# Patient Record
Sex: MALE | Race: WHITE | NOT HISPANIC OR LATINO | Employment: FULL TIME | URBAN - METROPOLITAN AREA
[De-identification: names, ages, dates, MRNs, and addresses within clinical notes are randomized per-mention and may not be internally consistent; named-entity substitution may affect disease eponyms.]

---

## 2017-01-08 ENCOUNTER — PATIENT MESSAGE (OUTPATIENT)
Dept: INTERNAL MEDICINE | Facility: CLINIC | Age: 22
End: 2017-01-08

## 2017-01-08 DIAGNOSIS — F98.8 ADD (ATTENTION DEFICIT DISORDER): ICD-10-CM

## 2017-01-10 RX ORDER — DEXTROAMPHETAMINE SACCHARATE, AMPHETAMINE ASPARTATE MONOHYDRATE, DEXTROAMPHETAMINE SULFATE AND AMPHETAMINE SULFATE 5; 5; 5; 5 MG/1; MG/1; MG/1; MG/1
40 CAPSULE, EXTENDED RELEASE ORAL DAILY
Qty: 60 CAPSULE | Refills: 0 | Status: SHIPPED | OUTPATIENT
Start: 2017-01-10 | End: 2017-02-08 | Stop reason: SDUPTHER

## 2017-01-26 ENCOUNTER — PATIENT MESSAGE (OUTPATIENT)
Dept: INTERNAL MEDICINE | Facility: CLINIC | Age: 22
End: 2017-01-26

## 2017-02-08 ENCOUNTER — PATIENT MESSAGE (OUTPATIENT)
Dept: INTERNAL MEDICINE | Facility: CLINIC | Age: 22
End: 2017-02-08

## 2017-02-08 DIAGNOSIS — F98.8 ADD (ATTENTION DEFICIT DISORDER): ICD-10-CM

## 2017-02-08 RX ORDER — DEXTROAMPHETAMINE SACCHARATE, AMPHETAMINE ASPARTATE MONOHYDRATE, DEXTROAMPHETAMINE SULFATE AND AMPHETAMINE SULFATE 5; 5; 5; 5 MG/1; MG/1; MG/1; MG/1
40 CAPSULE, EXTENDED RELEASE ORAL DAILY
Qty: 60 CAPSULE | Refills: 0 | Status: SHIPPED | OUTPATIENT
Start: 2017-02-09 | End: 2017-03-10 | Stop reason: SDUPTHER

## 2017-02-08 NOTE — TELEPHONE ENCOUNTER
[facility] database reviewed; last refill appropriate, no other controlled-substance medications listed. Refill sent in for 2/9/17

## 2017-02-17 ENCOUNTER — PATIENT MESSAGE (OUTPATIENT)
Dept: INTERNAL MEDICINE | Facility: CLINIC | Age: 22
End: 2017-02-17

## 2017-03-09 ENCOUNTER — PATIENT MESSAGE (OUTPATIENT)
Dept: INTERNAL MEDICINE | Facility: CLINIC | Age: 22
End: 2017-03-09

## 2017-03-09 DIAGNOSIS — F98.8 ADD (ATTENTION DEFICIT DISORDER): ICD-10-CM

## 2017-03-10 RX ORDER — DEXTROAMPHETAMINE SACCHARATE, AMPHETAMINE ASPARTATE MONOHYDRATE, DEXTROAMPHETAMINE SULFATE AND AMPHETAMINE SULFATE 5; 5; 5; 5 MG/1; MG/1; MG/1; MG/1
40 CAPSULE, EXTENDED RELEASE ORAL DAILY
Qty: 60 CAPSULE | Refills: 0 | Status: SHIPPED | OUTPATIENT
Start: 2017-03-10 | End: 2017-05-02 | Stop reason: SDUPTHER

## 2017-04-16 ENCOUNTER — PATIENT MESSAGE (OUTPATIENT)
Dept: INTERNAL MEDICINE | Facility: CLINIC | Age: 22
End: 2017-04-16

## 2017-04-16 DIAGNOSIS — F98.8 ADD (ATTENTION DEFICIT DISORDER): ICD-10-CM

## 2017-04-18 ENCOUNTER — TELEPHONE (OUTPATIENT)
Dept: INTERNAL MEDICINE | Facility: CLINIC | Age: 22
End: 2017-04-18

## 2017-04-18 RX ORDER — DEXTROAMPHETAMINE SACCHARATE, AMPHETAMINE ASPARTATE MONOHYDRATE, DEXTROAMPHETAMINE SULFATE AND AMPHETAMINE SULFATE 5; 5; 5; 5 MG/1; MG/1; MG/1; MG/1
40 CAPSULE, EXTENDED RELEASE ORAL DAILY
Qty: 60 CAPSULE | Refills: 0 | OUTPATIENT
Start: 2017-04-18

## 2017-04-18 NOTE — TELEPHONE ENCOUNTER
As we discussed at his first visit with me in 11/2016, this is a controlled-substance medication which means that he needs to have an in-person visit every 3 months. He was last seen at that initial visit 11/2016, and has canceled visits in 2/2017 and 3/2017, and additional bridging prescriptions have already been given.   Please let him know that he needs to come in for visit before further prescriptions can be given.

## 2017-04-18 NOTE — TELEPHONE ENCOUNTER
Spoke with pt---pt informed that Rx can not be given until he comes in to be seen per Dr. Bright.

## 2017-04-18 NOTE — TELEPHONE ENCOUNTER
----- Message from Daysi Henson sent at 4/18/2017  3:51 PM CDT -----  Contact: self/[phone]  Pt called in regards to a missed call from the office.      Please advise

## 2017-05-02 ENCOUNTER — OFFICE VISIT (OUTPATIENT)
Dept: INTERNAL MEDICINE | Facility: CLINIC | Age: 22
End: 2017-05-02
Payer: COMMERCIAL

## 2017-05-02 VITALS
WEIGHT: 208.13 LBS | TEMPERATURE: 98 F | BODY MASS INDEX: 31.54 KG/M2 | DIASTOLIC BLOOD PRESSURE: 77 MMHG | HEART RATE: 81 BPM | OXYGEN SATURATION: 99 % | SYSTOLIC BLOOD PRESSURE: 110 MMHG | HEIGHT: 68 IN

## 2017-05-02 DIAGNOSIS — F98.8 ADD (ATTENTION DEFICIT DISORDER): Primary | ICD-10-CM

## 2017-05-02 DIAGNOSIS — E66.9 OBESITY (BMI 30.0-34.9): ICD-10-CM

## 2017-05-02 PROCEDURE — 99999 PR PBB SHADOW E&M-EST. PATIENT-LVL III: CPT | Mod: PBBFAC,,, | Performed by: INTERNAL MEDICINE

## 2017-05-02 PROCEDURE — 1160F RVW MEDS BY RX/DR IN RCRD: CPT | Mod: S$GLB,,, | Performed by: INTERNAL MEDICINE

## 2017-05-02 PROCEDURE — 99214 OFFICE O/P EST MOD 30 MIN: CPT | Mod: S$GLB,,, | Performed by: INTERNAL MEDICINE

## 2017-05-02 RX ORDER — DEXTROAMPHETAMINE SACCHARATE, AMPHETAMINE ASPARTATE MONOHYDRATE, DEXTROAMPHETAMINE SULFATE AND AMPHETAMINE SULFATE 5; 5; 5; 5 MG/1; MG/1; MG/1; MG/1
40 CAPSULE, EXTENDED RELEASE ORAL DAILY
Qty: 60 CAPSULE | Refills: 0 | Status: SHIPPED | OUTPATIENT
Start: 2017-05-02 | End: 2017-06-02 | Stop reason: SDUPTHER

## 2017-05-02 NOTE — PROGRESS NOTES
Subjective:       Patient ID: Inder Matias is a 22 y.o. male.    Chief Complaint: Follow-up and Medication Refill    HPI  23 y/o man with h/o ADD here for follow-up and medication refill.    ADD - diagnosed in 5th grade, saw a psychiatrist at the time but hasn't seen one since 6th grade for this; prior to coming here, medication was prescribed by his PCP in [place].  In May 2016 tried switching from adderall XR to immediate-release adderall, but felt this didn't work as well for him, only felt that this helped for 1-2 hours with focus and concentration. Currnent dose is adderall XR 20mg x 2 pills once daily, has been on this dose for ~5 years except for brief trial as above.  Had decreased dose to 20mg daily, then ran out of medication this past weekend.   Hasn't had any adverse effects with this, no chest pain, dyspnea, or palpitations. No anxiety or depression.     Weight gain - still gaining weight, up ~20# since last visit. 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 Not exercising much. Has worked with a nutritionist in the past, feels that he knows what he needs to do.     H/o cholesteatoma in both ears, required tympanostomy, surgery to remove cholesteatoma, and tympanoplasty b/l while in grade school / high school. No problems since then. No recent changes in hearing.    Review of Systems   Constitutional: Negative for fatigue.   HENT: Negative for congestion, hearing loss and sore throat.    Eyes: Negative for visual disturbance.   Respiratory: Negative for cough and shortness of breath.    Cardiovascular: Negative for chest pain and palpitations.   Gastrointestinal: Negative.  Negative for abdominal pain.   Endocrine: Negative.    Genitourinary: Negative.    Musculoskeletal: Negative.    Skin: Negative for rash.   Allergic/Immunologic: Positive for environmental allergies (mild).   Neurological: Negative.    Psychiatric/Behavioral:  "Negative for dysphoric mood and sleep disturbance. The patient is not nervous/anxious.          Past medical history, surgical history, and family medical history reviewed and updated as appropriate.    Medications and allergies reviewed.     Objective:          Vitals:    05/02/17 0841   BP: 110/77   BP Location: Left arm   Patient Position: Sitting   Pulse: 81   Temp: 98 °F (36.7 °C)   TempSrc: Oral   SpO2: 99%   Weight: 94.4 kg (208 lb 1.8 oz)   Height: 5' 8" (1.727 m)     Body mass index is 31.64 kg/(m^2).  Physical Exam   Constitutional: He is oriented to person, place, and time. He appears well-developed and well-nourished. No distress.   HENT:   Head: Normocephalic and atraumatic.   Nose: Mucosal edema (mild) present.   Mouth/Throat: Posterior oropharyngeal erythema (mild erythema) present. No oropharyngeal exudate.   Post-surgical changes both TM   Eyes: Conjunctivae and EOM are normal. Pupils are equal, round, and reactive to light.   Neck: Neck supple. No thyromegaly present.   Cardiovascular: Normal rate, regular rhythm and normal heart sounds.    No murmur heard.  Pulmonary/Chest: Effort normal and breath sounds normal. No respiratory distress.   Abdominal: Soft. Bowel sounds are normal. He exhibits no distension. There is no tenderness.   Musculoskeletal: Normal range of motion. He exhibits no edema or tenderness.   Lymphadenopathy:     He has no cervical adenopathy.   Neurological: He is alert and oriented to person, place, and time. He has normal strength. No cranial nerve deficit or sensory deficit. Gait normal.   Skin: Skin is warm and dry.   Mild facial acne   Psychiatric: He has a normal mood and affect.   Vitals reviewed.      Lab Results   Component Value Date    WBC 5.21 11/12/2016    HGB 16.3 11/12/2016    HCT 49.9 11/12/2016     11/12/2016    ALT 19 11/12/2016    AST 14 11/12/2016     11/12/2016    K 4.6 11/12/2016     11/12/2016    CREATININE 1.1 11/12/2016    BUN 13 " 11/12/2016    CO2 27 11/12/2016       Assessment:       1. ADD (attention deficit disorder)    2. Obesity (BMI 30.0-34.9)        Plan:   Inder was seen today for follow-up and medication refill.    Diagnoses and all orders for this visit:    ADD (attention deficit disorder)  Comments[facility]  [facility] only [facility] this medication, fills are appropriate. No adverse effects noted from the medication; does have benefit in terms of concentration and focus especially for work as CPA. Discussed trial of lower dose; for now he feels most comfortable with current dose, but will try 20mg on some days if not working long hours.   Orders:  -     dextroamphetamine-amphetamine (ADDERALL XR) 20 MG 24 hr capsule; Take 2 capsules (40 mg total) by mouth once daily.    Obesity (BMI 30.0-34.9) - aware of health risks; planning to make healthy diet/exercise changes. First step of plan is eating out less often, cooking more at home.  Comments:  card given for health  - he will contact her if he wants to have an appointment    Health maintenance reviewed with patient. Up to date, reports had tetanus vaccine within past 10 years but isn't sure when (prior to college?)    Return in about 3 months (around 8/2/2017) for ADD follow-up.    Jay Bright MD  Internal Medicine  [facility]  5/2/2017

## 2017-05-02 NOTE — MR AVS SNAPSHOT
[facility]  [address]  [facility] [address]  Phone: [phone]  Fax: [phone]                  Inder Matias   2017 8:40 AM   Office Visit    Description:  Male : 1995   Provider:  Jay Bright MD   Department:  [facility]           Reason for Visit     Follow-up     Medication Refill           Diagnoses this Visit        Comments    ADD (attention deficit disorder)                To Do List           Future Appointments        Provider Department Dept Phone    2017 8:40 AM Jay Bright MD [facility] [phone]      Goals (5 Years of Data)     None      Follow-Up and Disposition     Return in about 3 months (around 2017) for ADD follow-up.       These Medications        Disp Refills Start End    dextroamphetamine-amphetamine (ADDERALL XR) 20 MG 24 hr capsule 60 capsule 0 2017     Take 2 capsules (40 mg total) by mouth once daily. - Oral    Pharmacy: [facility]/pharmacy #[id number] - [place], [place] - [address] #: [phone]         [facility]     [facility] Nurse Care Line -  Assistance  Unless otherwise directed by your provider, please contact [facility], our nurse care line that is available for  assistance.     Registered nurses in the [facility] provide: appointment scheduling, clinical advisement, health education, and other advisory services.  Call: [phone] (toll free)               Medications           Message regarding Medications     Verify the changes and/or additions to your medication regime listed below are the same as discussed with your clinician today.  If any of these changes or additions are incorrect, please notify your healthcare provider.             Verify that the below list of medications is an accurate representation of the medications you are currently taking.  If none reported, the list may be blank. If incorrect, please  "contact your healthcare provider. Carry this list with you in case of emergency.           Current Medications     dextroamphetamine-amphetamine (ADDERALL XR) 20 MG 24 hr capsule Take 2 capsules (40 mg total) by mouth once daily.           Clinical Reference Information           Your Vitals Were     BP Pulse Temp Height Weight SpO2    110/77 (BP Location: Left arm, Patient Position: Sitting) 81 98 °F (36.7 °C) (Oral) 5' 8" (1.727 m) 94.4 kg (208 lb 1.8 oz) 99%    BMI                31.64 kg/m2          Blood Pressure          Most Recent Value    BP  110/77      Allergies as of 5/2/2017     No Known Allergies      Immunizations Administered on Date of Encounter - 5/2/2017     None      Language Assistance Services     ATTENTION: Language assistance services are available, free of charge. Please call [phone].      ATENCIÓN: Si doronla daniel, tiene a askew disposición servicios gratuitos de asistencia lingüística. Llame al [phone].     CHÚ Ý: N?u b?n nói Ti?ng Vi?t, có các d?ch v? h? tr? ngôn ng? mi?n phí dành cho b?n. G?i s? [phone].         [facility] complies with applicable Federal civil rights laws and does not discriminate on the basis of race, color, national origin, age, disability, or sex.        "

## 2017-06-02 ENCOUNTER — PATIENT MESSAGE (OUTPATIENT)
Dept: INTERNAL MEDICINE | Facility: CLINIC | Age: 22
End: 2017-06-02

## 2017-06-02 DIAGNOSIS — F98.8 ADD (ATTENTION DEFICIT DISORDER): ICD-10-CM

## 2017-06-02 RX ORDER — DEXTROAMPHETAMINE SACCHARATE, AMPHETAMINE ASPARTATE MONOHYDRATE, DEXTROAMPHETAMINE SULFATE AND AMPHETAMINE SULFATE 5; 5; 5; 5 MG/1; MG/1; MG/1; MG/1
40 CAPSULE, EXTENDED RELEASE ORAL DAILY
Qty: 60 CAPSULE | Refills: 0 | Status: SHIPPED | OUTPATIENT
Start: 2017-06-02 | End: 2017-07-11 | Stop reason: SDUPTHER

## 2017-07-11 ENCOUNTER — PATIENT MESSAGE (OUTPATIENT)
Dept: INTERNAL MEDICINE | Facility: CLINIC | Age: 22
End: 2017-07-11

## 2017-07-14 RX ORDER — DEXTROAMPHETAMINE SACCHARATE, AMPHETAMINE ASPARTATE MONOHYDRATE, DEXTROAMPHETAMINE SULFATE AND AMPHETAMINE SULFATE 5; 5; 5; 5 MG/1; MG/1; MG/1; MG/1
40 CAPSULE, EXTENDED RELEASE ORAL DAILY
Qty: 60 CAPSULE | Refills: 0 | Status: SHIPPED | OUTPATIENT
Start: 2017-07-14 | End: 2017-08-15 | Stop reason: SDUPTHER

## 2017-08-01 ENCOUNTER — OFFICE VISIT (OUTPATIENT)
Dept: INTERNAL MEDICINE | Facility: CLINIC | Age: 22
End: 2017-08-01
Payer: COMMERCIAL

## 2017-08-01 VITALS
DIASTOLIC BLOOD PRESSURE: 79 MMHG | OXYGEN SATURATION: 98 % | SYSTOLIC BLOOD PRESSURE: 120 MMHG | TEMPERATURE: 98 F | BODY MASS INDEX: 31.07 KG/M2 | HEART RATE: 82 BPM | WEIGHT: 205 LBS | HEIGHT: 68 IN

## 2017-08-01 DIAGNOSIS — E66.9 OBESITY (BMI 30.0-34.9): ICD-10-CM

## 2017-08-01 DIAGNOSIS — F98.8 ATTENTION DEFICIT DISORDER, UNSPECIFIED HYPERACTIVITY PRESENCE: Primary | ICD-10-CM

## 2017-08-01 PROCEDURE — 99214 OFFICE O/P EST MOD 30 MIN: CPT | Mod: S$GLB,,, | Performed by: INTERNAL MEDICINE

## 2017-08-01 PROCEDURE — 99999 PR PBB SHADOW E&M-EST. PATIENT-LVL V: CPT | Mod: PBBFAC,,, | Performed by: INTERNAL MEDICINE

## 2017-08-01 NOTE — PROGRESS NOTES
Subjective:       Patient ID: Inder Matias is a 22 y.o. male.    Chief Complaint: ADD    HPI  21 y/o man here for follow up on ADD.    ADD - diagnosed in 5th grade, saw a psychiatrist at the time but hasn't seen one since 6th grade for this; prior to coming here, medication was prescribed by his PCP in [place].  In May 2016 tried switching from adderall XR to immediate-release adderall, but felt this didn't work as well for him, only felt that this helped for 1-2 hours with focus and concentration.   Had been on adderall XR 20mg x 2 tablets daily; was on this dose for about 5 years. Had run out for some time. Has been trying to take just 20mg on some days; some days feels this is sufficient, other days feels he needs the 40mg dose. Works as a CPA.   Hasn't had any adverse effects with this, no chest pain, dyspnea; does have occasional very brief palpitation but no significant change from prior, not worse with exertion, not frequent. No anxiety or depression.   No trouble sleeping.   He is drinking caffeine sometimes.   Still interested in working with psychologist on non-medication strategies for managing ADD long-term.     Weight gain - after last visit, joined gym and started working out - tends to do more weightlifting rather than aerobic activity. Has been making healthy diet changes. Asks about goal weight.   Has met with dietician in the past several years ago.    He will check with his parents about his vaccination record.     Review of Systems   Constitutional: Negative for activity change (improved), fatigue and unexpected weight change.   HENT: Negative for congestion and hearing loss.    Eyes: Negative for visual disturbance.   Respiratory: Negative for cough and shortness of breath.    Cardiovascular: Negative for chest pain and palpitations.   Gastrointestinal: Negative.  Negative for abdominal pain.   Endocrine: Negative.    Genitourinary: Negative.    Musculoskeletal: Negative.    Skin: Negative  "for rash.   Allergic/Immunologic: Positive for environmental allergies (mild).   Neurological: Negative.    Psychiatric/Behavioral: Negative for dysphoric mood and sleep disturbance. The patient is not nervous/anxious.          Past medical history, surgical history, and family medical history reviewed and updated as appropriate.    Medications and allergies reviewed.     Objective:          Vitals:    08/01/17 0818   BP: 120/79   BP Location: Right arm   Patient Position: Sitting   Pulse: 82   Temp: 97.7 °F (36.5 °C)   TempSrc: Oral   SpO2: 98%   Weight: 93 kg (205 lb 0.4 oz)   Height: 5' 8" (1.727 m)     Body mass index is 31.17 kg/m².  Physical Exam   Constitutional: He is oriented to person, place, and time. He appears well-developed and well-nourished. No distress.   HENT:   Head: Normocephalic and atraumatic.   Nose: Mucosal edema (mild) present.   Mouth/Throat: Posterior oropharyngeal erythema (mild erythema) present. No oropharyngeal exudate.   Eyes: Conjunctivae and EOM are normal. Pupils are equal, round, and reactive to light.   Neck: Neck supple. No thyromegaly present.   Cardiovascular: Normal rate, regular rhythm and normal heart sounds.    No murmur heard.  Pulmonary/Chest: Effort normal and breath sounds normal. No respiratory distress.   Abdominal: Soft. Bowel sounds are normal. He exhibits no distension. There is no tenderness.   Musculoskeletal: He exhibits no edema or tenderness.   Lymphadenopathy:     He has no cervical adenopathy.   Neurological: He is alert and oriented to person, place, and time. He has normal strength. No cranial nerve deficit or sensory deficit. Gait normal.   Skin: Skin is warm and dry.   Mild facial acne   Psychiatric: He has a normal mood and affect.   Vitals reviewed.      Lab Results   Component Value Date    WBC 5.21 11/12/2016    HGB 16.3 11/12/2016    HCT 49.9 11/12/2016     11/12/2016    ALT 19 11/12/2016    AST 14 11/12/2016     11/12/2016    K 4.6 " 11/12/2016     11/12/2016    CREATININE 1.1 11/12/2016    BUN 13 11/12/2016    CO2 27 11/12/2016       Assessment:       1. Attention deficit disorder, unspecified hyperactivity presence    2. Obesity (BMI 30.0-34.9)        Plan:   Inder was seen today for add.    Diagnoses and all orders for this visit:    Attention deficit disorder, unspecified hyperactivity presence - stable on current dose; has been trialing lower dose on some days when he feels this is manageable. Continue with lowest effective dose.  Recommended reduce/avoid caffeine use whenever taking this medication.  Not having significant side effects or problems with medication.  Aware of lack of long-term safety data on these medications, willing to work towards non-pharmacologic management.  -     Ambulatory Referral to Psychology    Obesity (BMI 30.0-34.9) - some weight loss; counseled re: increasing aerobic activity.   -     Ambulatory Referral to Medical Fitness    Health maintenance reviewed with patient.   He will check with his parents to get his vaccination record.   Recommended get flu vaccine in the fall.    Return in about 3 months (around 11/1/2017) for ADD.    Jay Bright MD  Internal Medicine  [facility]  8/1/2017

## 2017-08-14 ENCOUNTER — PATIENT MESSAGE (OUTPATIENT)
Dept: INTERNAL MEDICINE | Facility: CLINIC | Age: 22
End: 2017-08-14

## 2017-08-14 DIAGNOSIS — F98.8 ATTENTION DEFICIT DISORDER, UNSPECIFIED HYPERACTIVITY PRESENCE: Primary | ICD-10-CM

## 2017-08-17 RX ORDER — DEXTROAMPHETAMINE SACCHARATE, AMPHETAMINE ASPARTATE MONOHYDRATE, DEXTROAMPHETAMINE SULFATE AND AMPHETAMINE SULFATE 5; 5; 5; 5 MG/1; MG/1; MG/1; MG/1
40 CAPSULE, EXTENDED RELEASE ORAL DAILY
Qty: 60 CAPSULE | Refills: 0 | Status: SHIPPED | OUTPATIENT
Start: 2017-08-17 | End: 2017-09-25 | Stop reason: SDUPTHER

## 2017-09-25 ENCOUNTER — PATIENT MESSAGE (OUTPATIENT)
Dept: INTERNAL MEDICINE | Facility: CLINIC | Age: 22
End: 2017-09-25

## 2017-09-25 DIAGNOSIS — F98.8 ATTENTION DEFICIT DISORDER, UNSPECIFIED HYPERACTIVITY PRESENCE: Primary | ICD-10-CM

## 2017-09-25 RX ORDER — DEXTROAMPHETAMINE SACCHARATE, AMPHETAMINE ASPARTATE MONOHYDRATE, DEXTROAMPHETAMINE SULFATE AND AMPHETAMINE SULFATE 5; 5; 5; 5 MG/1; MG/1; MG/1; MG/1
40 CAPSULE, EXTENDED RELEASE ORAL DAILY
Qty: 60 CAPSULE | Refills: 0 | Status: SHIPPED | OUTPATIENT
Start: 2017-09-25 | End: 2017-11-07 | Stop reason: SDUPTHER

## 2017-11-07 ENCOUNTER — IMMUNIZATION (OUTPATIENT)
Dept: INTERNAL MEDICINE | Facility: CLINIC | Age: 22
End: 2017-11-07
Payer: COMMERCIAL

## 2017-11-07 ENCOUNTER — OFFICE VISIT (OUTPATIENT)
Dept: INTERNAL MEDICINE | Facility: CLINIC | Age: 22
End: 2017-11-07
Payer: COMMERCIAL

## 2017-11-07 VITALS
TEMPERATURE: 98 F | HEIGHT: 68 IN | BODY MASS INDEX: 30.21 KG/M2 | WEIGHT: 199.31 LBS | DIASTOLIC BLOOD PRESSURE: 78 MMHG | SYSTOLIC BLOOD PRESSURE: 124 MMHG | OXYGEN SATURATION: 99 % | HEART RATE: 89 BPM

## 2017-11-07 DIAGNOSIS — E66.9 OBESITY (BMI 30.0-34.9): ICD-10-CM

## 2017-11-07 DIAGNOSIS — F98.8 ATTENTION DEFICIT DISORDER, UNSPECIFIED HYPERACTIVITY PRESENCE: Primary | ICD-10-CM

## 2017-11-07 PROCEDURE — 90471 IMMUNIZATION ADMIN: CPT | Mod: S$GLB,,, | Performed by: INTERNAL MEDICINE

## 2017-11-07 PROCEDURE — 99999 PR PBB SHADOW E&M-EST. PATIENT-LVL III: CPT | Mod: PBBFAC,,, | Performed by: INTERNAL MEDICINE

## 2017-11-07 PROCEDURE — 99214 OFFICE O/P EST MOD 30 MIN: CPT | Mod: 25,S$GLB,, | Performed by: INTERNAL MEDICINE

## 2017-11-07 PROCEDURE — 90686 IIV4 VACC NO PRSV 0.5 ML IM: CPT | Mod: S$GLB,,, | Performed by: INTERNAL MEDICINE

## 2017-11-07 RX ORDER — DEXTROAMPHETAMINE SACCHARATE, AMPHETAMINE ASPARTATE MONOHYDRATE, DEXTROAMPHETAMINE SULFATE AND AMPHETAMINE SULFATE 5; 5; 5; 5 MG/1; MG/1; MG/1; MG/1
40 CAPSULE, EXTENDED RELEASE ORAL DAILY
Qty: 60 CAPSULE | Refills: 0 | Status: SHIPPED | OUTPATIENT
Start: 2017-11-07 | End: 2017-12-28 | Stop reason: SDUPTHER

## 2017-11-07 NOTE — PROGRESS NOTES
Subjective:       Patient ID: Inder Matias is a 22 y.o. male.    Chief Complaint: ADD    HPI  21 y/o man here for follow up on ADD.    ADD - diagnosed in 5th grade, saw a psychiatrist at the time but hasn't seen one since 6th grade for this; prior to coming here, medication was prescribed by his PCP in [place].  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  Currently taking adderall XR 20mg, one or two tablets per day depending on what he needs to do that day.  Last rx given 9/25/17. [facility] reviewed, rx record is appropriate and matches Epic record; no additional prescribers or other controlled medications.  Referred to psychology at last visit.  Would like to have next appt in ~2 months as Jan-April is very busy for him.    Referred to medical fitness program at last visit, has gone, working on healthier diet and exercise.    He brings in immunization record today, also called his mother during visit to confirm he did have tetanus booster in 2011.    Review of Systems   Constitutional: Negative for activity change (improved), appetite change, fatigue and unexpected weight change.   HENT: Negative.    Eyes: Negative for visual disturbance.   Respiratory: Negative for cough and shortness of breath.    Cardiovascular: Negative for chest pain and palpitations.   Gastrointestinal: Negative.  Negative for abdominal pain.   Endocrine: Negative.    Genitourinary: Negative.    Musculoskeletal: Negative.    Skin: Negative for rash.   Allergic/Immunologic: Positive for environmental allergies (mild).   Neurological: Negative.    Psychiatric/Behavioral: Negative for dysphoric mood and sleep disturbance. The patient is not nervous/anxious.          Past medical history, surgical history, and family medical history reviewed and updated as appropriate.    Medications and allergies reviewed.     Objective:         "  Vitals:    11/07/17 0805   BP: 124/78   BP Location: Right arm   Patient Position: Sitting   Pulse: 89   Temp: 98 °F (36.7 °C)   TempSrc: Oral   SpO2: 99%   Weight: 90.4 kg (199 lb 4.7 oz)   Height: 5' 8" (1.727 m)     Body mass index is 30.3 kg/m².  Physical Exam   Constitutional: He is oriented to person, place, and time. He appears well-developed and well-nourished. No distress.   HENT:   Head: Normocephalic and atraumatic.   Mouth/Throat: Oropharynx is clear and moist.   Eyes: Conjunctivae and EOM are normal. Pupils are equal, round, and reactive to light.   Neck: Neck supple. No thyromegaly present.   Cardiovascular: Normal rate, regular rhythm and normal heart sounds.    No murmur heard.  Pulmonary/Chest: Effort normal and breath sounds normal. No respiratory distress.   Abdominal: Soft. There is no tenderness.   Musculoskeletal: He exhibits no edema or tenderness.   Lymphadenopathy:     He has no cervical adenopathy.   Neurological: He is alert and oriented to person, place, and time. No cranial nerve deficit. Gait normal.   Skin: Skin is warm and dry.   Psychiatric: He has a normal mood and affect. His behavior is normal.   Vitals reviewed.      Lab Results   Component Value Date    WBC 5.21 11/12/2016    HGB 16.3 11/12/2016    HCT 49.9 11/12/2016     11/12/2016    ALT 19 11/12/2016    AST 14 11/12/2016     11/12/2016    K 4.6 11/12/2016     11/12/2016    CREATININE 1.1 11/12/2016    BUN 13 11/12/2016    CO2 27 11/12/2016       Assessment:       1. Attention deficit disorder, unspecified hyperactivity presence        Plan:   Inder was seen today for add.    Diagnoses and all orders for this visit:    Attention deficit disorder, unspecified hyperactivity presence  -     dextroamphetamine-amphetamine (ADDERALL XR) 20 MG 24 hr capsule; Take 2 capsules (40 mg total) by mouth once daily.  -     Comprehensive metabolic panel; Future    Continues to use medication appropriately; refill given. " He will schedule f/u visit a little early as he may not be able to come in for visit Feb-April.   He will call to make appt with counselor.    Continue to work with med fitness program and follow healthy diet - doing well with this.    Health maintenance reviewed with patient.   No new sexual partners, declines repeat STI screening at this time.  Lab with next visit.  Flu shot today.    Return in about 2 months (around 1/7/2018) for ADD.    Jay Bright MD  Internal Medicine  [facility]  11/7/2017

## 2017-11-20 ENCOUNTER — DOCUMENTATION ONLY (OUTPATIENT)
Dept: INTERNAL MEDICINE | Facility: CLINIC | Age: 22
End: 2017-11-20

## 2017-11-20 NOTE — PROGRESS NOTES
Outside records:  [facility] form rec'd with vaccination information  DTP 5/28/93, 9/17/93, 3/1/94, 10/25/94, 7/29/98  Polio 5/28/93, 9/17/93, 7/21/94, 7/29/98  MMR 7/21/94, 7/29/98  One unreadable - HiB?  HBV 4/1/93, 4/28/93, 7/21/94

## 2017-12-28 DIAGNOSIS — F98.8 ATTENTION DEFICIT DISORDER, UNSPECIFIED HYPERACTIVITY PRESENCE: ICD-10-CM

## 2017-12-28 RX ORDER — DEXTROAMPHETAMINE SACCHARATE, AMPHETAMINE ASPARTATE MONOHYDRATE, DEXTROAMPHETAMINE SULFATE AND AMPHETAMINE SULFATE 5; 5; 5; 5 MG/1; MG/1; MG/1; MG/1
40 CAPSULE, EXTENDED RELEASE ORAL DAILY
Qty: 60 CAPSULE | Refills: 0 | Status: SHIPPED | OUTPATIENT
Start: 2017-12-28 | End: 2018-02-26 | Stop reason: SDUPTHER

## 2018-01-23 ENCOUNTER — OFFICE VISIT (OUTPATIENT)
Dept: INTERNAL MEDICINE | Facility: CLINIC | Age: 23
End: 2018-01-23
Payer: COMMERCIAL

## 2018-01-23 ENCOUNTER — LAB VISIT (OUTPATIENT)
Dept: LAB | Facility: HOSPITAL | Age: 23
End: 2018-01-23
Attending: INTERNAL MEDICINE
Payer: COMMERCIAL

## 2018-01-23 VITALS
SYSTOLIC BLOOD PRESSURE: 119 MMHG | DIASTOLIC BLOOD PRESSURE: 78 MMHG | WEIGHT: 197.06 LBS | OXYGEN SATURATION: 99 % | HEART RATE: 75 BPM | HEIGHT: 68 IN | TEMPERATURE: 98 F | BODY MASS INDEX: 29.86 KG/M2

## 2018-01-23 DIAGNOSIS — F98.8 ATTENTION DEFICIT DISORDER, UNSPECIFIED HYPERACTIVITY PRESENCE: Primary | ICD-10-CM

## 2018-01-23 DIAGNOSIS — F98.8 ATTENTION DEFICIT DISORDER, UNSPECIFIED HYPERACTIVITY PRESENCE: ICD-10-CM

## 2018-01-23 DIAGNOSIS — J30.89 ENVIRONMENTAL AND SEASONAL ALLERGIES: ICD-10-CM

## 2018-01-23 PROBLEM — E66.3 OVERWEIGHT (BMI 25.0-29.9): Status: ACTIVE | Noted: 2017-05-02

## 2018-01-23 LAB
ALBUMIN SERPL BCP-MCNC: 4.7 G/DL
ALP SERPL-CCNC: 44 U/L
ALT SERPL W/O P-5'-P-CCNC: 23 U/L
ANION GAP SERPL CALC-SCNC: 9 MMOL/L
AST SERPL-CCNC: 18 U/L
BILIRUB SERPL-MCNC: 0.4 MG/DL
BUN SERPL-MCNC: 15 MG/DL
CALCIUM SERPL-MCNC: 9.7 MG/DL
CHLORIDE SERPL-SCNC: 105 MMOL/L
CO2 SERPL-SCNC: 29 MMOL/L
CREAT SERPL-MCNC: 1.3 MG/DL
EST. GFR  (AFRICAN AMERICAN): >60 ML/MIN/1.73 M^2
EST. GFR  (NON AFRICAN AMERICAN): >60 ML/MIN/1.73 M^2
GLUCOSE SERPL-MCNC: 109 MG/DL
POTASSIUM SERPL-SCNC: 4.9 MMOL/L
PROT SERPL-MCNC: 7.5 G/DL
SODIUM SERPL-SCNC: 143 MMOL/L

## 2018-01-23 PROCEDURE — 80053 COMPREHEN METABOLIC PANEL: CPT

## 2018-01-23 PROCEDURE — 36415 COLL VENOUS BLD VENIPUNCTURE: CPT

## 2018-01-23 PROCEDURE — 99999 PR PBB SHADOW E&M-EST. PATIENT-LVL III: CPT | Mod: PBBFAC,,, | Performed by: INTERNAL MEDICINE

## 2018-01-23 PROCEDURE — 99214 OFFICE O/P EST MOD 30 MIN: CPT | Mod: S$GLB,,, | Performed by: INTERNAL MEDICINE

## 2018-01-23 NOTE — PROGRESS NOTES
"Subjective:       Patient ID: Inder Matias is a 22 y.o. male.    Chief Complaint: ADD    HPI  23 y/o man here for follow up on ADD.    ADD - diagnosed in 5th grade, saw a psychiatrist at the time but hasn't seen one since 6th grade for this; prior to coming here, medication was prescribed by his PCP in [place].  May 2016 - tried switching from adderall XR to immediate-release adderall, but felt this didn't work as well for him; switched back.  Currently taking adderall XR 20mg, one or two tablets per day depending on what he needs to do that day. Doesn't note any adverse effects with either dose, but "I feel a little better when I don't take two every day."  Works as [employer], so Jan-April generally very busy with work that is more difficult with ADD.  Previously referred to counseling - called in, has appt with Dr Jalloh for March.    [facility], 11/7/17, 9/25/17; only one provider.    Had been working with medical fitness program, no longer following with . Exercising 2-3 times/week for 45 min.  Following healthy diet generally.   Drinking alcohol rarely. Tries to drink plenty of water daily.   Sleeping well at night - recently about 6 hours/night.     Review of Systems   Constitutional: Negative for activity change (improved), appetite change, fatigue and unexpected weight change (intentional weight loss).   HENT: Negative.    Eyes: Negative for visual disturbance.   Respiratory: Negative for cough and shortness of breath.    Cardiovascular: Negative for chest pain and palpitations.   Gastrointestinal: Negative.  Negative for abdominal pain.   Endocrine: Negative.    Genitourinary: Negative.    Musculoskeletal: Negative.    Skin: Negative for rash.   Allergic/Immunologic: Positive for environmental allergies (mild).   Neurological: Negative.    Psychiatric/Behavioral: Negative for dysphoric mood and sleep disturbance. The patient is not nervous/anxious.          Past medical " "history, surgical history, and family medical history reviewed and updated as appropriate.    Medications and allergies reviewed.     Objective:          Vitals:    01/23/18 0802   BP: 119/78   BP Location: Right arm   Patient Position: Sitting   Pulse: 75   Temp: 97.5 °F (36.4 °C)   TempSrc: Oral   SpO2: 99%   Weight: 89.4 kg (197 lb 1.5 oz)   Height: 5' 8" (1.727 m)     Body mass index is 29.97 kg/m².  Physical Exam   Constitutional: He is oriented to person, place, and time. He appears well-developed and well-nourished. No distress.   HENT:   Head: Normocephalic and atraumatic.   Mouth/Throat: Oropharynx is clear and moist.   Eyes: Conjunctivae and EOM are normal. Pupils are equal, round, and reactive to light.   Neck: Neck supple. No thyromegaly present.   Cardiovascular: Normal rate, regular rhythm and normal heart sounds.    No murmur heard.  Pulmonary/Chest: Effort normal and breath sounds normal. No respiratory distress.   Abdominal: Soft. There is no tenderness.   Musculoskeletal: He exhibits no edema or tenderness.   Lymphadenopathy:     He has no cervical adenopathy.   Neurological: He is alert and oriented to person, place, and time. No cranial nerve deficit. Gait normal.   Skin: Skin is warm and dry.   Psychiatric: He has a normal mood and affect. His behavior is normal.   Vitals reviewed.      Lab Results   Component Value Date    WBC 5.21 11/12/2016    HGB 16.3 11/12/2016    HCT 49.9 11/12/2016     11/12/2016    ALT 19 11/12/2016    AST 14 11/12/2016     11/12/2016    K 4.6 11/12/2016     11/12/2016    CREATININE 1.1 11/12/2016    BUN 13 11/12/2016    CO2 27 11/12/2016       Assessment:       1. Attention deficit disorder, unspecified hyperactivity presence    2. Environmental and seasonal allergies        Plan:   Inder was seen today for add.    Diagnoses and all orders for this visit:    Attention deficit disorder, unspecified hyperactivity presence  Stable, doing well on current " medication regimen with no adverse effects. LABPPMP reviewed, appropriate. Continue current medications. Plan for follow up with psychiatry for re-evaluation re: ADD and best treatment plan, as this hasn't been fully evaluated in ~10 years.    Environmental and seasonal allergies - recommended saline nasal rinse, steroid nasal spray if noticing more throat-clearing or other allergy symptoms.    CMP done today, awaiting results.    Health maintenance reviewed with patient. Up to date.    Follow-up in about 3 months (around 4/23/2018) for follow up, annual physical.    Jay Bright MD  Internal Medicine  [facility]  1/23/2018

## 2018-01-23 NOTE — MEDICAL/APP STUDENT
Mr. Inder Matias is a 21y/o M w a h/o ADD here today for his annual check up.    Pt has no complaints today    ADD- dx in 6th grade and has been on medication consistently since that time. Has been taking 40mg of adderal. Some days he takes half his dose depending on how he feels. Denies any trouble sleeping, palpitations or shakiness. Has scheduled an appointment with Psychiatry in March to get reevaluated. Plans to see a counselor to discuss ways to deal with sxs without medication.    Diet- Eats egg or protein shake for breaskfast, lunch he has a ham or turkey wrap with no cheese, dinner he eats chicken and vegetabels.  EtOH- drinks 2-3 times a month and has 3 glasses of liquor per sitting.  Denies smoking, IVDU.  Exercise- gets 45 min of exercise lifting weights 2-3 days a week.     Objective:  HENT: normal, some scarring in the eats  Eyes: JO-ANN  CVS: Normal S1 and S2, no added sounds, no peripheral swelling, distal pulses intact  Resp: Normal breath sounds, normal respiratory effort, no wheezes or rales  Abdo: soft nontender, no masses palpated    Assessment  1) ADD  2) Annual Check up    Plan  1) ADD  -on  40mg of adderal  -compliant with medication, no current side effects   - plan to meet with psychiatrist for reevaluation    2) Annual Check Up  -labs done today  - no immunizations needed

## 2018-02-26 DIAGNOSIS — F98.8 ATTENTION DEFICIT DISORDER, UNSPECIFIED HYPERACTIVITY PRESENCE: ICD-10-CM

## 2018-02-26 RX ORDER — DEXTROAMPHETAMINE SACCHARATE, AMPHETAMINE ASPARTATE MONOHYDRATE, DEXTROAMPHETAMINE SULFATE AND AMPHETAMINE SULFATE 5; 5; 5; 5 MG/1; MG/1; MG/1; MG/1
40 CAPSULE, EXTENDED RELEASE ORAL DAILY
Qty: 60 CAPSULE | Refills: 0 | Status: SHIPPED | OUTPATIENT
Start: 2018-02-26 | End: 2018-03-08 | Stop reason: SDUPTHER

## 2018-03-08 ENCOUNTER — OFFICE VISIT (OUTPATIENT)
Dept: PSYCHIATRY | Facility: CLINIC | Age: 23
End: 2018-03-08
Payer: COMMERCIAL

## 2018-03-08 VITALS
SYSTOLIC BLOOD PRESSURE: 156 MMHG | WEIGHT: 196.13 LBS | DIASTOLIC BLOOD PRESSURE: 94 MMHG | BODY MASS INDEX: 29.72 KG/M2 | HEIGHT: 68 IN | HEART RATE: 88 BPM

## 2018-03-08 DIAGNOSIS — F90.2 ATTENTION DEFICIT HYPERACTIVITY DISORDER (ADHD), COMBINED TYPE: ICD-10-CM

## 2018-03-08 PROCEDURE — 99999 PR PBB SHADOW E&M-EST. PATIENT-LVL III: CPT | Mod: PBBFAC,,, | Performed by: PSYCHIATRY & NEUROLOGY

## 2018-03-08 PROCEDURE — 99203 OFFICE O/P NEW LOW 30 MIN: CPT | Mod: S$GLB,,, | Performed by: PSYCHIATRY & NEUROLOGY

## 2018-03-08 RX ORDER — DEXTROAMPHETAMINE SACCHARATE, AMPHETAMINE ASPARTATE MONOHYDRATE, DEXTROAMPHETAMINE SULFATE AND AMPHETAMINE SULFATE 5; 5; 5; 5 MG/1; MG/1; MG/1; MG/1
40 CAPSULE, EXTENDED RELEASE ORAL DAILY
Qty: 60 CAPSULE | Refills: 0 | Status: SHIPPED | OUTPATIENT
Start: 2018-04-01 | End: 2018-07-24 | Stop reason: SDUPTHER

## 2018-03-08 RX ORDER — DEXTROAMPHETAMINE SACCHARATE, AMPHETAMINE ASPARTATE MONOHYDRATE, DEXTROAMPHETAMINE SULFATE AND AMPHETAMINE SULFATE 5; 5; 5; 5 MG/1; MG/1; MG/1; MG/1
40 CAPSULE, EXTENDED RELEASE ORAL EVERY MORNING
Qty: 60 CAPSULE | Refills: 0 | Status: SHIPPED | OUTPATIENT
Start: 2018-06-01 | End: 2018-05-14

## 2018-03-08 RX ORDER — DEXTROAMPHETAMINE SACCHARATE, AMPHETAMINE ASPARTATE MONOHYDRATE, DEXTROAMPHETAMINE SULFATE AND AMPHETAMINE SULFATE 5; 5; 5; 5 MG/1; MG/1; MG/1; MG/1
40 CAPSULE, EXTENDED RELEASE ORAL EVERY MORNING
Qty: 60 CAPSULE | Refills: 0 | Status: SHIPPED | OUTPATIENT
Start: 2018-05-01 | End: 2018-05-14 | Stop reason: RX

## 2018-03-08 NOTE — PROGRESS NOTES
"3/8/2018 8:18 AM   Inder Matias   1995   [id number]           OUTPATIENT PSYCHIATRY INITIAL EVALUATION NOTE      Inder Matias, a 22 y.o. male, presenting for initial evaluation visit. Met with patient.    Reason for Encounter: Referral from PCP. Patient complains of   Chief Complaint   Patient presents with    Inattention   .    History of Present Illness:    Today, pt presented 10mins late  Reports being dx as ADD in grade 5 and has been taking meds for it since then. Never saw a psychiatrist as an adult because his primary care prescribed it. He moved from [facility] to [facility] about 1.5yrs ago and now his new pcp wanted a second opion on med management. He reports that its hard for him to focus at work as an ,  when he is not taking his medications. Also his household chores are in a disarray and cant keep track of things when he doesn't take hi medicaiton    No depression or anxiety or other acute psychiatric complains currently.    Psychosocial stressors:  work    Psychiatric Review Of Systems - Is patient experiencing or having changes in:    Symptoms of ADHD:   ADHD Rating Scale IV (ADHD-RS-IV) With Adult Prompts  Occurring in 2 domains- Work and Home of the following:-YES  By rating None-0; Mild-1; Moderate-2; severe-3    INATTENTION  1) Carelessness- 2  2) Difficulty sustaining attention in activities-3   3) Doesn't Listen-3    4) No follow through- 3   5) Cant Organize-3   6) Avoid/ dislike tasks requiring sustained mental effort-2  7) Loses important items- 0  8) Easily distractible- 3   9) Forgetful in daily activities-2    HYPERACTIVITY  10) Squirms and fidgets-1  11) Cant stay seated-2  12) Runs/climbs excessively-2  13) Cant play/work quietly-1  14) On the go "driven by a motor"-1  15) Talks excessively - 2  16) Blurts out answers- 3  17) Cant wait for turns- 3  18) Intrudes/ interrupts others- 3    TOTAL SCORE-  Meets criteria for ADHD -combined " type    --------------------------------------------------------------------------------------------------------------------------------------------------------------------------------------------------------------------------------------    Symptoms of Depression: NO diminished mood or loss of interest/anhedonia; irritability, diminished energy, change in sleep, change in appetite, diminished concentration or cognition or indecisiveness, PMA/R, excessive guilt or hopelessness or worthlessness, suicidal ideations - Passive/ Active -none, Suicide attempt- none      Symptoms of JAMES: NO excessive anxiety/worry/fear, more days than not, about numerous issues, difficult to control, with restlessness, fatigue, poor concentration, irritability, muscle tension, sleep disturbance; causes functionally impairing distress     Symptoms of val or hypomania: NO elevated, expansive, or irritable mood with increased energy or activity; with inflated self-esteem or grandiosity, decreased need for sleep, increased rate of speech,  racing thoughts, distractibility, increased goal directed activity or PMA, risky/disinhibited behavior    Symptoms of psychosis: NO hallucinations, delusions, disorganized thinking, disorganized behavior or abnormal motor behavior, or negative symptoms (diminshed emotional expression, avolition, anhedonia, alogia, asociality     Sleep: NO initiation, maintenance, early morning awakening with inability to return to sleep    Risk Parameters:  Patient reports no suicidal ideation  Patient reports no homicidal ideation  Patient reports no self-injurious behavior  Patient reports no violent behavior    Other symptoms    Symptoms of Panic Disorder: NO recurrent panic attacks, precipitated or un-precipitated, source of worry and/or behavioral changes secondary; with or without agoraphobia    Symptoms of PTSD: NO h/o trauma; re-experiencing/intrusive symptoms, avoidant behavior, negative alterations in cognition  or mood, or hyperarousal symptoms; with or without dissociative symptoms     Symptoms of OCD: NO obsessions, compulsions or ruminations    Symptoms of Eating Disorders: NOanorexia, bulimia or binging    Substance Use:   none    Psychotropic medication review  Previous Trials-  None    Current meds-  Adderall XR 20mg qd but some days takes 40mg qd    HISTORY     Past Medical History:   Diagnosis Date    ADD (attention deficit disorder)     ADHD (attention deficit hyperactivity disorder)     History of ear disorder     Hx of psychiatric care          History of Seizures or TBI: none    Past Surgical History:   Procedure Laterality Date    INNER EAR SURGERY      in childhood for cholesteatoma    SINUS SURGERY      TONSILLECTOMY      TYMPANOSTOMY         Family History   Problem Relation Age of Onset    Cancer Father      lymphoma or throat cancer (unsure)    Stroke Father      reports due to low blood pressure    Cancer Maternal Grandmother     Cancer Paternal Grandfather      lymphoma, ?other cancer    Diabetes Neg Hx     Heart disease Neg Hx        Social History     Social History    Marital status: Single     Spouse name: N/A    Number of children: N/A    Years of education: N/A     Social History Main Topics    Smoking status: Never Smoker    Smokeless tobacco: Never Used    Alcohol use Yes      Comment: 1-2 beers on weekdays, no more than 6 on weekend days occasionally. not drinking daily. no blackouts.    Drug use: No    Sexual activity: Yes     Partners: Female     Birth control/ protection: Condom     Other Topics Concern    Patient Feels They Ought To Cut Down On Drinking/Drug Use No    Patient Annoyed By Others Criticizing Their Drinking/Drug Use No    Patient Has Felt Bad Or Guilty About Drinking/Drug Use No    Patient Has Had A Drink/Used Drugs As An Eye Opener In The Am No     Social History Narrative    Moved to [place] from [place] about 1.5 yrs ago.     Now working as  "CPA.     Not exercising regularly; working on healthier eating.           OBJECTIVE       Constitutional  Vitals:  Most recent vital signs, dated less than 90 days prior to this appointment, were reviewed.    Vitals:    03/08/18 0814   BP: (!) 156/94   Pulse: 88   Weight: 89 kg (196 lb 1.6 oz)   Height: 5' 8" (1.727 m)            Laboratory Data: Reviewed most recent     Medications:  Outpatient Encounter Prescriptions as of 3/8/2018   Medication Sig Dispense Refill    [START ON 4/1/2018] dextroamphetamine-amphetamine (ADDERALL XR) 20 MG 24 hr capsule Take 2 capsules (40 mg total) by mouth once daily. 60 capsule 0    [START ON 5/1/2018] dextroamphetamine-amphetamine (ADDERALL XR) 20 MG 24 hr capsule Take 2 capsules (40 mg total) by mouth every morning. 60 capsule 0    [START ON 6/1/2018] dextroamphetamine-amphetamine (ADDERALL XR) 20 MG 24 hr capsule Take 2 capsules (40 mg total) by mouth every morning. 60 capsule 0     No facility-administered encounter medications on file as of 3/8/2018.        Allergy:  Review of patient's allergies indicates:  No Known Allergies    Nutritional Screening: Considering the patient's height and weight, medications, medical history and preferences, should a referral be made to the dietitian? no    Review of Systems:  General: unremarkable, age appropriate  Resp:  No shortness of breath, hyperventilation or cough  Cvs:  No tachycardia or chest pain  Gi:  No nausea, vomiting, pain, constipation or diarrhea  Musculoskeletal:  No pain or stiffness of the joints  Muscle Strength/Tone:no dystonia, no tremor  Neurological:  No weakness, sensory changes, seizures, confusion, memory loss, tremor or other abnormal movements   Gait & Station:non-ataxic    Mental Status Exam:  Appearance: unremarkable, age appropriate, casually dressed  Behavior/Cooperation:appropriate friendly and cooperative   Speech: appropriate rate, volume and tone spontaneous   Language: uses words appropriately; NO " "aphasia or dysarthria  Mood: " good "  Affect:  congruent with mood and appropriate to situation/content  Thought Process:  normal and logical  Thought Content: normal, no suicidality, no homicidality, delusions, or paranoia  Sensorium:  Awake  Alert and Oriented: x3 grossly intact  Memory: Intact to conversation both recent and remote  Attention/concentration: appropriate for age/education.   Insight: Intact  Judgment:Intact      Strengths and Liabilities: Strength: Patient accepts guidance/feedback, Strength: Patient is expressive/articulate., Strength: Patient is intelligent., Strength: Patient is physically healthy., Strength: Patient has positive support network.    ASSESSMENT     Impression:       ICD-10-CM ICD-9-CM   1. Attention deficit hyperactivity disorder (ADHD), combined type F90.2 314.01       TREATMENT PLAN     · Medication Management:   · Continue Adderall XR 40mg qd   instructed to take drug holidays when he doesn't have work or too many house hold chores to do. Pt agreeable with this.   Discussed risks of addiction, tolerance, and possible withdrawal associated with stimulant use. Possible side affects including heart palpitations, restlessness, increased anxiety, decreased appetite, insomnia, and dry mouth were also discussed with the patient. Patient advised not to mix the medication with alcoh    · Gave resource of work book on adult adhd  · Checked [place]  and no irregularities were noted. Has been regularly getting this dose of adderall for > 3 yrs  · Labs: reviewed most recent labs  · The treatment plan and follow up plan were reviewed with the patient.  · Discussed with patient informed consent, risks vs. benefits, alternative treatments, side effect profile and the inherent unpredictability of individual responses to these treatments. The patient expresses understanding of the above and displays the capacity to agree with this current plan and had no other questions.  · Encouraged Patient to " keep future appointments.   · Take medications as prescribed and abstain from substance abuse.   · In the event of an emergency patient was advised to go to the emergency room.    Return to Clinic:  3 months, as needed; pt prefers to f/u with PCP as he can also get med checks if need be. He is flexible to coming and seeing me as well.     > than 50% of total time spend on coordination of care and counseling   (which included pts differential diagnosis and prognosis for psychiatric conditions, risks, benefits of treatments, instructions and adherence to treatment plan, risk reduction, reviewing current psychiatric medication regimen, medical problems and social stressors. In addtion to possible discussion with other healthcare provider/s)    Add on Psychotherapy time: 0  Total Face to face time: 60mins    CISCO LERNER MD   [facility]   3/8/2018 8:18 AM

## 2018-03-08 NOTE — PATIENT INSTRUCTIONS
[facility]  Mental Health Medications: Stimulants    What are the side effects?    Most side effects are minor and disappear when dosage levels are lowered. The most common side effects include:   Decreased appetite. People seem to be less hungry during the middle of the day, but they are often hungry by dinnertime as the medication wears off.   Sleep problems. If a person cannot fall asleep, the doctor may prescribe a lower dose. The doctor might also suggest that the medication is taken earlier in the day, or stop the afternoon or evening dose.    Stomachaches and headaches.   Tics.  A few people develop sudden, repetitive movements or sounds called tics. These tics may or may not be noticeable. Changing the medication dosage may make tics go away. Some people also may appear to have a personality change, such as appearing flat or without emotion. Talk with yourdoctor if you see any of these side effects.    FDA warning on possible rare side effects    In 2007, the FDA required that all makers of ADHD medications develop Patient Medication Guides. The guides must alert patients to possible heart and psychiatric problems related to ADHD medicine. The FDA required the Patient Medication Guides because a review of data found that ADHD patients with heart conditions had a slightly higher risk of strokes, heart attacks, and sudden death when taking the medications. The review also found a slightly higher risk (about 1 in 1,000) for medication-related psychiatric problems, such as hearing voices, having hallucinations, becoming suspicious for no reason, or becoming manic. This happened to patients who had no history of psychiatric problems.    Stimulant medications may lead to drug abuse or dependence, but the risk is highest for those patient taking this class of medication who do not have ADHD. Research shows that teens with ADHD who took stimulant medications were less likely to abuse  drugs than those who did not take stimulant medications.  Proper diagnosis is the key to minimizing this risk.

## 2018-05-14 ENCOUNTER — PATIENT MESSAGE (OUTPATIENT)
Dept: PSYCHIATRY | Facility: CLINIC | Age: 23
End: 2018-05-14

## 2018-05-14 RX ORDER — DEXTROAMPHETAMINE SACCHARATE, AMPHETAMINE ASPARTATE MONOHYDRATE, DEXTROAMPHETAMINE SULFATE AND AMPHETAMINE SULFATE 5; 5; 5; 5 MG/1; MG/1; MG/1; MG/1
40 CAPSULE, EXTENDED RELEASE ORAL EVERY MORNING
Qty: 60 CAPSULE | Refills: 0 | Status: SHIPPED | OUTPATIENT
Start: 2018-06-14 | End: 2018-08-06 | Stop reason: SDUPTHER

## 2018-05-14 RX ORDER — DEXTROAMPHETAMINE SACCHARATE, AMPHETAMINE ASPARTATE MONOHYDRATE, DEXTROAMPHETAMINE SULFATE AND AMPHETAMINE SULFATE 5; 5; 5; 5 MG/1; MG/1; MG/1; MG/1
40 CAPSULE, EXTENDED RELEASE ORAL EVERY MORNING
Qty: 60 CAPSULE | Refills: 0 | Status: SHIPPED | OUTPATIENT
Start: 2018-05-14 | End: 2018-07-24 | Stop reason: SDUPTHER

## 2018-05-14 NOTE — TELEPHONE ENCOUNTER
Requested Prescriptions     Refill on Adderall to a different pharmacy  Checked [place]  and no irregularities were noted.  Last fill was on 4/11/18    CISCO LERNER MD   Department of Psychiatry   [facility]  5/14/2018 3:15 PM

## 2018-07-24 ENCOUNTER — OFFICE VISIT (OUTPATIENT)
Dept: INTERNAL MEDICINE | Facility: CLINIC | Age: 23
End: 2018-07-24
Payer: COMMERCIAL

## 2018-07-24 VITALS
BODY MASS INDEX: 30.47 KG/M2 | HEIGHT: 68 IN | TEMPERATURE: 98 F | HEART RATE: 76 BPM | SYSTOLIC BLOOD PRESSURE: 110 MMHG | OXYGEN SATURATION: 98 % | WEIGHT: 201.06 LBS | DIASTOLIC BLOOD PRESSURE: 64 MMHG

## 2018-07-24 DIAGNOSIS — F90.2 ATTENTION DEFICIT HYPERACTIVITY DISORDER (ADHD), COMBINED TYPE: Primary | ICD-10-CM

## 2018-07-24 DIAGNOSIS — R63.5 WEIGHT GAIN: ICD-10-CM

## 2018-07-24 DIAGNOSIS — H61.23 EXCESSIVE CERUMEN IN BOTH EAR CANALS: ICD-10-CM

## 2018-07-24 PROCEDURE — 3008F BODY MASS INDEX DOCD: CPT | Mod: CPTII,S$GLB,, | Performed by: INTERNAL MEDICINE

## 2018-07-24 PROCEDURE — 99214 OFFICE O/P EST MOD 30 MIN: CPT | Mod: S$GLB,,, | Performed by: INTERNAL MEDICINE

## 2018-07-24 PROCEDURE — 99999 PR PBB SHADOW E&M-EST. PATIENT-LVL III: CPT | Mod: PBBFAC,,, | Performed by: INTERNAL MEDICINE

## 2018-07-24 NOTE — MEDICAL/APP STUDENT
SUBJECTIVE    Patient name: Inder Matias  Date of encounter: July 24, 2018  Chief Complaint: Prescription refill    Inder Matias is a 22yo male presenting to general practice clinic for refill of his adderall for attention deficit disorder. Pt was diagnosed while in the sixth grade and reports that it has helped him focus ever since.  Pt endorses weight loss in the past, but after being advised to increase food intake, he reports that his appetite has returned to baseline- although diet and exercise regimens have proved challenging given the heavy amount of travel with his job.  Pt does not endorse any changes in mood while on adderall.      Since then, he sees Dr. Jalloh at [facility].  In 2016, pt tried Immediate release Adderall, but did not find it as effect and returned to extended release adderall.  Until March 2018, pt received prescription from PCP in [place], but did not have regular care under a psychiatrist.      Of note- seasonal allergies are not bothersome and pt does not take any medication for it.        Pertinent Family, Past medical, Surgical history has been reviewed as appropriate.    Current Medications:   Adderall XR 20mg once every morning po    NKA    ROS:  Constitutional: No fevers, chills, diaphoresis, or unexpected weight chains  HEENT: No recent changes in vision or hearing  Respiratory: No shortness of breath, coughing, or wheezing  Cardiovascular: No chest pains or palpitations  Gastrointestinal: No pain, nausea, vomiting, diarrhea, heartburn, or constipation  : No irregularities with urination reported  Neurological: No weakness or loss of consciousness  Psychiatric: No acute mood changes    OBJECTIVE    Vitals:    BP: 110/64  Pulse: 76  Temperature: 98.1  SpO2: 98%  BMI: 30.57    Physical Exam:  Constitutional: Well developed, does not appear distressed  Head: Normocephalic and atraumatic  Eyes: PERRLA, Normal Conjunctivae  Ears: Cerumen present bilaterally  Nose: No  inflammation of turbinates  Neck: No stiffness, tenderness, or lymphadenopathy  Respiratory: Lungs clear to auscultation, vesicular breath sounds with no added sounds  Cardiovascular: Normal rate and rhythm.  S1 and S2 present.  Gastrointestinal: Soft with no distention or tenderness  Skin: Warm and dry.  No rashes present  Neuro: Strength 5/5 in upper and lower limbs bilaterally.  NAD on Cranial Nerve Exam.    Psychiatric: Thought content and mood normal.    ASSESSMENT    Inder Matias is a 24 yo male presenting to general practice clinic for prescription refill of adderall.  Patient reports that the medication helps with focusing and began seeing a psychiatrist in March.  He reports no adverse effects, so continuing with his current dose is appropriate.      PLAN    Attention Deficit Disorder  -- Continue Adderall XR 20mg once every morning po  -- Continue following up with Dr. Jalloh    BMI over 30  -- Pt discussed struggling with proper diet and exercise regimens given the heavy travel  -- Pt has attempted working out in hotels in the past  -- Pt advised on finding routines that are both healthy and enjoyable so that he can adhere to them    Cerumen   --Pt advised on hydrogen peroxide drops     Seasonal Allergies  -- Under control without medication    Paddy Gabriel  MS-4

## 2018-07-24 NOTE — PROGRESS NOTES
Subjective:       Patient ID: Inder Matias is a 23 y.o. male.    Chief Complaint: Medication Refill    HPI   22 y/o man with h/o ADHD here for follow up and medication refill.    ADHD - diagnosed in 5th grade, saw a psychiatrist at the time but hasn't seen one since 6th grade for this; prior to coming here, medication was prescribed by his PCP in [place].  May 2016 - tried switching from adderall XR to immediate-release adderall, but felt this didn't work as well for him; switched back.  Saw Dr Jalloh with psychiatry 3/2018, reasonable to continue on adderall XR at current dose.   No problems with anxiety, sleep, palpitations, tremor, or other adverse effects.   Not losing weight -- restarted gym membership after being out of town often; gaining weight, not exercising as regularly, diet less healthy bc traveling frequently.     Problem with ear wax - often has ear wax buildup. Hasn't had medical ear cleaning. Doesn't use drops.     Review of Systems   Constitutional: Positive for activity change (less active recently) and unexpected weight change (weight gain). Negative for fatigue.   HENT: Negative for congestion, ear pain and hearing loss.    Eyes: Negative for visual disturbance.   Respiratory: Negative for cough and shortness of breath.    Cardiovascular: Negative for chest pain and palpitations.   Gastrointestinal: Negative.  Negative for abdominal pain.   Endocrine: Negative.    Genitourinary: Negative.    Musculoskeletal: Negative.    Skin: Negative for rash.   Allergic/Immunologic: Positive for environmental allergies (mild).   Neurological: Negative.    Psychiatric/Behavioral: Negative for dysphoric mood and sleep disturbance. The patient is not nervous/anxious.          Past medical history, surgical history, and family medical history reviewed and updated as appropriate.    Medications and allergies reviewed.     Objective:          Vitals:    07/24/18 0757   BP: 110/64   BP Location: Right arm  "  Patient Position: Sitting   Pulse: 76   Temp: 98.1 °F (36.7 °C)   TempSrc: Oral   SpO2: 98%   Weight: 91.2 kg (201 lb 1 oz)   Height: 5' 8" (1.727 m)     Body mass index is 30.57 kg/m².  Physical Exam   Constitutional: He is oriented to person, place, and time. He appears well-developed and well-nourished. No distress.   HENT:   Head: Normocephalic and atraumatic.   Right Ear: External ear normal. No decreased hearing is noted.   Left Ear: External ear normal. No decreased hearing is noted.   Mouth/Throat: Oropharynx is clear and moist.   Cerumen present both ears, not fully blocked. Visible area of TM not erythematous. No pain with ear exam   Eyes: Conjunctivae and EOM are normal. Pupils are equal, round, and reactive to light.   Neck: Neck supple.   Cardiovascular: Normal rate, regular rhythm and normal heart sounds.    No murmur heard.  Pulmonary/Chest: Effort normal and breath sounds normal. No respiratory distress.   Abdominal: Soft. There is no tenderness.   Musculoskeletal: He exhibits no edema or tenderness.   Lymphadenopathy:     He has no cervical adenopathy.   Neurological: He is alert and oriented to person, place, and time. He displays no tremor. No cranial nerve deficit. He exhibits normal muscle tone. Gait normal.   Skin: Skin is warm and dry.   Psychiatric: He has a normal mood and affect.   Vitals reviewed.      Lab Results   Component Value Date    WBC 5.21 11/12/2016    HGB 16.3 11/12/2016    HCT 49.9 11/12/2016     11/12/2016    ALT 23 01/23/2018    AST 18 01/23/2018     01/23/2018    K 4.9 01/23/2018     01/23/2018    CREATININE 1.3 01/23/2018    BUN 15 01/23/2018    CO2 29 01/23/2018       Assessment:       1. Attention deficit hyperactivity disorder (ADHD), combined type    2. Weight gain    3. Excessive cerumen in both ear canals        Plan:   Inder was seen today for medication refill.    Diagnoses and all orders for this visit:    Attention deficit hyperactivity " disorder (ADHD), combined type - reviewed psychiatry note. Reasonable to continue current medication as his symptoms are reasonably controlled when taking this and he does not have any significant adverse effects thus far. Will continue to monitor.   Ok to continue 20mg dose when not doing very intensive work; with work requiring more intensive focus, ok to switch to 40mg dose. Does not currently need refill; he will send in message when refill is needed.  He did find it helpful to meet with Dr Jalloh and discuss management strategies and medications for his ADHD.    Weight gain - exercising less, eating less healthily. Encouraged him to work on making healthy food choices especially when traveling, work on regular exercise.    Excessive cerumen in both ear canals - recommended try OTC ear drops routinely for 1-2 weeks, then contact clinic to see nurse here or in ENT clinic for ear cleaning if needed.    Health maintenance reviewed with patient. Up to date.    Follow-up in about 3 months (around 10/24/2018) for ADHD.  Plan for next annual exam with labs in January/February 2019.    Jay Bright MD  Internal Medicine  [facility]  7/24/2018

## 2018-08-04 ENCOUNTER — PATIENT MESSAGE (OUTPATIENT)
Dept: INTERNAL MEDICINE | Facility: CLINIC | Age: 23
End: 2018-08-04

## 2018-08-06 ENCOUNTER — PATIENT MESSAGE (OUTPATIENT)
Dept: INTERNAL MEDICINE | Facility: CLINIC | Age: 23
End: 2018-08-06

## 2018-08-06 DIAGNOSIS — F90.2 ATTENTION DEFICIT HYPERACTIVITY DISORDER (ADHD), COMBINED TYPE: Chronic | ICD-10-CM

## 2018-08-07 PROBLEM — F90.2 ATTENTION DEFICIT HYPERACTIVITY DISORDER (ADHD), COMBINED TYPE: Chronic | Status: ACTIVE | Noted: 2018-08-07

## 2018-08-07 RX ORDER — DEXTROAMPHETAMINE SACCHARATE, AMPHETAMINE ASPARTATE MONOHYDRATE, DEXTROAMPHETAMINE SULFATE AND AMPHETAMINE SULFATE 5; 5; 5; 5 MG/1; MG/1; MG/1; MG/1
40 CAPSULE, EXTENDED RELEASE ORAL EVERY MORNING
Qty: 60 CAPSULE | Refills: 0 | Status: SHIPPED | OUTPATIENT
Start: 2018-08-07 | End: 2018-09-21 | Stop reason: SDUPTHER

## 2018-08-07 NOTE — TELEPHONE ENCOUNTER
NATY reviewed - last rx filled for adderall is 5/24/18; rx that was sent by Dr Jalloh 6/14 has not been filled  New rx appropriate, sent out

## 2018-09-21 DIAGNOSIS — F90.2 ATTENTION DEFICIT HYPERACTIVITY DISORDER (ADHD), COMBINED TYPE: Chronic | ICD-10-CM

## 2018-09-21 RX ORDER — DEXTROAMPHETAMINE SACCHARATE, AMPHETAMINE ASPARTATE MONOHYDRATE, DEXTROAMPHETAMINE SULFATE AND AMPHETAMINE SULFATE 5; 5; 5; 5 MG/1; MG/1; MG/1; MG/1
40 CAPSULE, EXTENDED RELEASE ORAL EVERY MORNING
Qty: 60 CAPSULE | Refills: 0 | Status: SHIPPED | OUTPATIENT
Start: 2018-09-21 | End: 2018-11-06 | Stop reason: SDUPTHER

## 2018-10-23 ENCOUNTER — PATIENT MESSAGE (OUTPATIENT)
Dept: INTERNAL MEDICINE | Facility: CLINIC | Age: 23
End: 2018-10-23

## 2018-10-23 DIAGNOSIS — Z00.00 ANNUAL PHYSICAL EXAM: Primary | ICD-10-CM

## 2018-10-24 ENCOUNTER — PATIENT MESSAGE (OUTPATIENT)
Dept: INTERNAL MEDICINE | Facility: CLINIC | Age: 23
End: 2018-10-24

## 2018-10-24 NOTE — TELEPHONE ENCOUNTER
Pt not able to schedule for routine follow up for ADHD meds  Checked in re: symptoms / side effects via email; if not having any adverse effects reasonable to continue med until next available routine f/u visit.

## 2018-11-06 DIAGNOSIS — F90.2 ATTENTION DEFICIT HYPERACTIVITY DISORDER (ADHD), COMBINED TYPE: Chronic | ICD-10-CM

## 2018-11-07 ENCOUNTER — PATIENT MESSAGE (OUTPATIENT)
Dept: INTERNAL MEDICINE | Facility: CLINIC | Age: 23
End: 2018-11-07

## 2018-11-07 DIAGNOSIS — F90.2 ATTENTION DEFICIT HYPERACTIVITY DISORDER (ADHD), COMBINED TYPE: Chronic | ICD-10-CM

## 2018-11-08 RX ORDER — DEXTROAMPHETAMINE SACCHARATE, AMPHETAMINE ASPARTATE MONOHYDRATE, DEXTROAMPHETAMINE SULFATE AND AMPHETAMINE SULFATE 5; 5; 5; 5 MG/1; MG/1; MG/1; MG/1
40 CAPSULE, EXTENDED RELEASE ORAL EVERY MORNING
Qty: 60 CAPSULE | Refills: 0 | Status: SHIPPED | OUTPATIENT
Start: 2018-11-08 | End: 2019-01-03 | Stop reason: SDUPTHER

## 2018-11-08 RX ORDER — DEXTROAMPHETAMINE SACCHARATE, AMPHETAMINE ASPARTATE MONOHYDRATE, DEXTROAMPHETAMINE SULFATE AND AMPHETAMINE SULFATE 5; 5; 5; 5 MG/1; MG/1; MG/1; MG/1
40 CAPSULE, EXTENDED RELEASE ORAL EVERY MORNING
Qty: 60 CAPSULE | Refills: 0 | OUTPATIENT
Start: 2018-11-08

## 2018-11-08 NOTE — TELEPHONE ENCOUNTER
Chart reviewed, pt has confirmed no adverse effects related to this medication recently -- he will check in regarding this via patient portal as he is not able to come in for visit until January, but has been stable and using medication appropriately.    LABPPMP reviewed, appropriate - last refill 9/27/18

## 2019-01-03 ENCOUNTER — LAB VISIT (OUTPATIENT)
Dept: LAB | Facility: HOSPITAL | Age: 24
End: 2019-01-03
Attending: INTERNAL MEDICINE
Payer: COMMERCIAL

## 2019-01-03 ENCOUNTER — OFFICE VISIT (OUTPATIENT)
Dept: INTERNAL MEDICINE | Facility: CLINIC | Age: 24
End: 2019-01-03
Payer: COMMERCIAL

## 2019-01-03 ENCOUNTER — IMMUNIZATION (OUTPATIENT)
Dept: INTERNAL MEDICINE | Facility: CLINIC | Age: 24
End: 2019-01-03
Payer: COMMERCIAL

## 2019-01-03 VITALS
HEART RATE: 89 BPM | BODY MASS INDEX: 30.14 KG/M2 | WEIGHT: 198.88 LBS | SYSTOLIC BLOOD PRESSURE: 128 MMHG | HEIGHT: 68 IN | TEMPERATURE: 98 F | OXYGEN SATURATION: 98 % | DIASTOLIC BLOOD PRESSURE: 78 MMHG

## 2019-01-03 DIAGNOSIS — Z11.3 ROUTINE SCREENING FOR STI (SEXUALLY TRANSMITTED INFECTION): ICD-10-CM

## 2019-01-03 DIAGNOSIS — F90.2 ATTENTION DEFICIT HYPERACTIVITY DISORDER (ADHD), COMBINED TYPE: Chronic | ICD-10-CM

## 2019-01-03 DIAGNOSIS — Z00.00 ANNUAL PHYSICAL EXAM: ICD-10-CM

## 2019-01-03 DIAGNOSIS — Z00.00 ANNUAL PHYSICAL EXAM: Primary | ICD-10-CM

## 2019-01-03 LAB
ALBUMIN SERPL BCP-MCNC: 4.8 G/DL
ALP SERPL-CCNC: 39 U/L
ALT SERPL W/O P-5'-P-CCNC: 34 U/L
ANION GAP SERPL CALC-SCNC: 12 MMOL/L
AST SERPL-CCNC: 22 U/L
BILIRUB SERPL-MCNC: 0.6 MG/DL
BUN SERPL-MCNC: 12 MG/DL
CALCIUM SERPL-MCNC: 9.5 MG/DL
CHLORIDE SERPL-SCNC: 104 MMOL/L
CHOLEST SERPL-MCNC: 217 MG/DL
CHOLEST/HDLC SERPL: 3.9 {RATIO}
CO2 SERPL-SCNC: 26 MMOL/L
CREAT SERPL-MCNC: 1 MG/DL
ERYTHROCYTE [DISTWIDTH] IN BLOOD BY AUTOMATED COUNT: 13.5 %
EST. GFR  (AFRICAN AMERICAN): >60 ML/MIN/1.73 M^2
EST. GFR  (NON AFRICAN AMERICAN): >60 ML/MIN/1.73 M^2
GLUCOSE SERPL-MCNC: 83 MG/DL
HCT VFR BLD AUTO: 52.7 %
HDLC SERPL-MCNC: 56 MG/DL
HDLC SERPL: 25.8 %
HGB BLD-MCNC: 16.4 G/DL
HIV 1+2 AB+HIV1 P24 AG SERPL QL IA: NEGATIVE
LDLC SERPL CALC-MCNC: 141.6 MG/DL
MCH RBC QN AUTO: 29.6 PG
MCHC RBC AUTO-ENTMCNC: 31.1 G/DL
MCV RBC AUTO: 95 FL
NONHDLC SERPL-MCNC: 161 MG/DL
PLATELET # BLD AUTO: 239 K/UL
PMV BLD AUTO: 10.8 FL
POTASSIUM SERPL-SCNC: 3.8 MMOL/L
PROT SERPL-MCNC: 8.3 G/DL
RBC # BLD AUTO: 5.54 M/UL
SODIUM SERPL-SCNC: 142 MMOL/L
TRIGL SERPL-MCNC: 97 MG/DL
WBC # BLD AUTO: 4.72 K/UL

## 2019-01-03 PROCEDURE — 90471 FLU VACCINE (QUAD) GREATER THAN OR EQUAL TO 3YO PRESERVATIVE FREE IM: ICD-10-PCS | Mod: S$GLB,,, | Performed by: INTERNAL MEDICINE

## 2019-01-03 PROCEDURE — 80061 LIPID PANEL: CPT

## 2019-01-03 PROCEDURE — 90471 IMMUNIZATION ADMIN: CPT | Mod: S$GLB,,, | Performed by: INTERNAL MEDICINE

## 2019-01-03 PROCEDURE — 99999 PR PBB SHADOW E&M-EST. PATIENT-LVL IV: ICD-10-PCS | Mod: PBBFAC,,, | Performed by: INTERNAL MEDICINE

## 2019-01-03 PROCEDURE — 90686 IIV4 VACC NO PRSV 0.5 ML IM: CPT | Mod: S$GLB,,, | Performed by: INTERNAL MEDICINE

## 2019-01-03 PROCEDURE — 36415 COLL VENOUS BLD VENIPUNCTURE: CPT

## 2019-01-03 PROCEDURE — 99999 PR PBB SHADOW E&M-EST. PATIENT-LVL IV: CPT | Mod: PBBFAC,,, | Performed by: INTERNAL MEDICINE

## 2019-01-03 PROCEDURE — 99395 PREV VISIT EST AGE 18-39: CPT | Mod: S$GLB,,, | Performed by: INTERNAL MEDICINE

## 2019-01-03 PROCEDURE — 99395 PR PREVENTIVE VISIT,EST,18-39: ICD-10-PCS | Mod: S$GLB,,, | Performed by: INTERNAL MEDICINE

## 2019-01-03 PROCEDURE — 80053 COMPREHEN METABOLIC PANEL: CPT

## 2019-01-03 PROCEDURE — 90686 FLU VACCINE (QUAD) GREATER THAN OR EQUAL TO 3YO PRESERVATIVE FREE IM: ICD-10-PCS | Mod: S$GLB,,, | Performed by: INTERNAL MEDICINE

## 2019-01-03 PROCEDURE — 85027 COMPLETE CBC AUTOMATED: CPT

## 2019-01-03 PROCEDURE — 86592 SYPHILIS TEST NON-TREP QUAL: CPT

## 2019-01-03 PROCEDURE — 86703 HIV-1/HIV-2 1 RESULT ANTBDY: CPT

## 2019-01-03 RX ORDER — DEXTROAMPHETAMINE SACCHARATE, AMPHETAMINE ASPARTATE MONOHYDRATE, DEXTROAMPHETAMINE SULFATE AND AMPHETAMINE SULFATE 5; 5; 5; 5 MG/1; MG/1; MG/1; MG/1
40 CAPSULE, EXTENDED RELEASE ORAL EVERY MORNING
Qty: 60 CAPSULE | Refills: 0 | Status: SHIPPED | OUTPATIENT
Start: 2019-01-03 | End: 2019-02-04 | Stop reason: SDUPTHER

## 2019-01-03 NOTE — PROGRESS NOTES
Subjective:       Patient ID: Inder Matias is a 23 y.o. male.    Chief Complaint: Medication Refill and Follow-up    HPI  22 y/o man with ADHD here for follow up, med refill, and due for annual exam.    ADHD - diagnosed in 5th grade, did see psychiatrist initially, then medication prescribed since 6th grade by PCP.  May 2016 - tried switching from adderall XR to immediate-release adderall, but felt this didn't work as well for him; switched back.  Saw Dr Jalloh with psychiatry 3/2018, reasonable to continue on adderall XR at current dose.   No problems with anxiety, sleep, palpitations, tremor, or other adverse effects. Weight stable.    Tries to work out regularly even when traveling - jump rope, trx straps for cardio exercise  Gets 2-3 days/week with this  Walks to work when in town; more sedentary in the past month or two    Not using drops for ear wax regularly.    Has had new partner (female) since last STI screen in 2016. Using condoms consistently, no STI symptoms, does want routine screening.    Review of Systems   Constitutional: Negative for activity change, fatigue and unexpected weight change.   HENT: Negative.    Eyes: Negative for visual disturbance.   Respiratory: Negative for cough and shortness of breath.    Cardiovascular: Negative for chest pain, palpitations and leg swelling.   Gastrointestinal: Negative.  Negative for abdominal pain.   Endocrine: Negative.    Genitourinary: Negative.    Musculoskeletal: Negative.    Skin: Negative for rash.   Allergic/Immunologic: Positive for environmental allergies (mild).   Neurological: Negative.  Negative for tremors.   Psychiatric/Behavioral: Negative for dysphoric mood and sleep disturbance. The patient is not nervous/anxious.          Past medical history, surgical history, and family medical history reviewed and updated as appropriate.    Medications and allergies reviewed.     Objective:          Vitals:    01/03/19 0825 01/03/19 0927   BP: 130/88  "128/78   BP Location: Left arm    Patient Position: Sitting    Pulse: 89    Temp: 98 °F (36.7 °C)    TempSrc: Oral    SpO2: 98%    Weight: 90.2 kg (198 lb 13.7 oz)    Height: 5' 8" (1.727 m)      Body mass index is 30.24 kg/m².  Physical Exam   Constitutional: He is oriented to person, place, and time. He appears well-developed and well-nourished. No distress.   HENT:   Head: Normocephalic and atraumatic.   Right Ear: External ear normal. No decreased hearing is noted.   Left Ear: External ear normal. No decreased hearing is noted.   Mouth/Throat: Oropharynx is clear and moist.   Cerumen R ear, not fully blocked. Visible area of TM not erythematous. No pain with ear exam. L ear normal   Eyes: Conjunctivae and EOM are normal. Pupils are equal, round, and reactive to light. No scleral icterus.   Neck: Neck supple.   Cardiovascular: Normal rate, regular rhythm and normal heart sounds.   No murmur heard.  Pulmonary/Chest: Effort normal and breath sounds normal. No respiratory distress.   Abdominal: Soft. Bowel sounds are normal. He exhibits no distension. There is no tenderness.   Musculoskeletal: He exhibits no edema or tenderness.   Lymphadenopathy:     He has no cervical adenopathy.   Neurological: He is alert and oriented to person, place, and time. He displays no tremor. No cranial nerve deficit. Gait normal.   Skin: Skin is warm and dry.   Psychiatric: He has a normal mood and affect.   Vitals reviewed.      Lab Results   Component Value Date    WBC 5.21 11/12/2016    HGB 16.3 11/12/2016    HCT 49.9 11/12/2016     11/12/2016    ALT 23 01/23/2018    AST 18 01/23/2018     01/23/2018    K 4.9 01/23/2018     01/23/2018    CREATININE 1.3 01/23/2018    BUN 15 01/23/2018    CO2 29 01/23/2018       Assessment:       1. Annual physical exam    2. Attention deficit hyperactivity disorder (ADHD), combined type    3. Routine screening for STI (sexually transmitted infection)        Plan:   Inder was seen " today for medication refill and follow-up.    Diagnoses and all orders for this visit:    Annual physical exam - Overall healthy, doing well. Reviewed chronic and preventive health concerns.  -     Comprehensive metabolic panel; Future  -     Lipid panel; Future    Attention deficit hyperactivity disorder (ADHD), combined type - reasonable to continue at current dose. Uses 40mg only on longer/more intensive workdays  New insurance - sending med to [facility] in case PA needed, otherwise preferably will get this through [facility] near him  -     dextroamphetamine-amphetamine (ADDERALL XR) 20 MG 24 hr capsule; Take 2 capsules (40 mg total) by mouth every morning.    Routine screening for STI (sexually transmitted infection)  -     C. trachomatis/N. gonorrhoeae by AMP DNA; Future  -     RPR; Future  -     HIV 1/2 Ag/Ab (4th Gen); Future    Health maintenance reviewed with patient.   Flu shot today    Follow-up in about 3 months (around 4/3/2019) for ADHD.    Jay Bright MD  Internal Medicine  [facility]  1/3/2019

## 2019-01-04 ENCOUNTER — PATIENT MESSAGE (OUTPATIENT)
Dept: INTERNAL MEDICINE | Facility: CLINIC | Age: 24
End: 2019-01-04

## 2019-01-04 LAB — RPR SER QL: NORMAL

## 2019-01-05 ENCOUNTER — LAB VISIT (OUTPATIENT)
Dept: LAB | Facility: HOSPITAL | Age: 24
End: 2019-01-05
Attending: INTERNAL MEDICINE
Payer: COMMERCIAL

## 2019-01-05 DIAGNOSIS — Z11.3 ROUTINE SCREENING FOR STI (SEXUALLY TRANSMITTED INFECTION): ICD-10-CM

## 2019-01-05 PROCEDURE — 87491 CHLMYD TRACH DNA AMP PROBE: CPT

## 2019-01-07 LAB
C TRACH DNA SPEC QL NAA+PROBE: NOT DETECTED
N GONORRHOEA DNA SPEC QL NAA+PROBE: NOT DETECTED

## 2019-02-04 ENCOUNTER — PATIENT MESSAGE (OUTPATIENT)
Dept: INTERNAL MEDICINE | Facility: CLINIC | Age: 24
End: 2019-02-04

## 2019-02-04 DIAGNOSIS — F90.2 ATTENTION DEFICIT HYPERACTIVITY DISORDER (ADHD), COMBINED TYPE: Chronic | ICD-10-CM

## 2019-02-04 RX ORDER — DEXTROAMPHETAMINE SACCHARATE, AMPHETAMINE ASPARTATE MONOHYDRATE, DEXTROAMPHETAMINE SULFATE AND AMPHETAMINE SULFATE 5; 5; 5; 5 MG/1; MG/1; MG/1; MG/1
40 CAPSULE, EXTENDED RELEASE ORAL EVERY MORNING
Qty: 60 CAPSULE | Refills: 0 | Status: SHIPPED | OUTPATIENT
Start: 2019-02-04 | End: 2019-03-08 | Stop reason: SDUPTHER

## 2019-03-08 DIAGNOSIS — F90.2 ATTENTION DEFICIT HYPERACTIVITY DISORDER (ADHD), COMBINED TYPE: Chronic | ICD-10-CM

## 2019-03-10 RX ORDER — DEXTROAMPHETAMINE SACCHARATE, AMPHETAMINE ASPARTATE MONOHYDRATE, DEXTROAMPHETAMINE SULFATE AND AMPHETAMINE SULFATE 5; 5; 5; 5 MG/1; MG/1; MG/1; MG/1
40 CAPSULE, EXTENDED RELEASE ORAL EVERY MORNING
Qty: 60 CAPSULE | Refills: 0 | Status: SHIPPED | OUTPATIENT
Start: 2019-03-10 | End: 2019-04-09 | Stop reason: SDUPTHER

## 2019-03-11 NOTE — TELEPHONE ENCOUNTER
Refill sent. Please let patient know that prescription flagged that it may need a prior authorization - he should contact us if having any trouble getting this filled

## 2019-04-09 ENCOUNTER — OFFICE VISIT (OUTPATIENT)
Dept: INTERNAL MEDICINE | Facility: CLINIC | Age: 24
End: 2019-04-09
Payer: COMMERCIAL

## 2019-04-09 VITALS
SYSTOLIC BLOOD PRESSURE: 108 MMHG | HEIGHT: 68 IN | HEART RATE: 90 BPM | OXYGEN SATURATION: 98 % | BODY MASS INDEX: 29.86 KG/M2 | TEMPERATURE: 98 F | DIASTOLIC BLOOD PRESSURE: 80 MMHG | WEIGHT: 197.06 LBS

## 2019-04-09 DIAGNOSIS — Z11.3 ROUTINE SCREENING FOR STI (SEXUALLY TRANSMITTED INFECTION): ICD-10-CM

## 2019-04-09 DIAGNOSIS — Z86.69 HISTORY OF EAR DISORDER: ICD-10-CM

## 2019-04-09 DIAGNOSIS — F90.2 ATTENTION DEFICIT HYPERACTIVITY DISORDER (ADHD), COMBINED TYPE: Primary | Chronic | ICD-10-CM

## 2019-04-09 PROCEDURE — 3008F PR BODY MASS INDEX (BMI) DOCUMENTED: ICD-10-PCS | Mod: CPTII,S$GLB,, | Performed by: INTERNAL MEDICINE

## 2019-04-09 PROCEDURE — 99214 PR OFFICE/OUTPT VISIT, EST, LEVL IV, 30-39 MIN: ICD-10-PCS | Mod: S$GLB,,, | Performed by: INTERNAL MEDICINE

## 2019-04-09 PROCEDURE — 99999 PR PBB SHADOW E&M-EST. PATIENT-LVL III: ICD-10-PCS | Mod: PBBFAC,,, | Performed by: INTERNAL MEDICINE

## 2019-04-09 PROCEDURE — 99214 OFFICE O/P EST MOD 30 MIN: CPT | Mod: S$GLB,,, | Performed by: INTERNAL MEDICINE

## 2019-04-09 PROCEDURE — 3008F BODY MASS INDEX DOCD: CPT | Mod: CPTII,S$GLB,, | Performed by: INTERNAL MEDICINE

## 2019-04-09 PROCEDURE — 99999 PR PBB SHADOW E&M-EST. PATIENT-LVL III: CPT | Mod: PBBFAC,,, | Performed by: INTERNAL MEDICINE

## 2019-04-09 RX ORDER — DEXTROAMPHETAMINE SACCHARATE, AMPHETAMINE ASPARTATE MONOHYDRATE, DEXTROAMPHETAMINE SULFATE AND AMPHETAMINE SULFATE 5; 5; 5; 5 MG/1; MG/1; MG/1; MG/1
40 CAPSULE, EXTENDED RELEASE ORAL EVERY MORNING
Qty: 60 CAPSULE | Refills: 0 | Status: SHIPPED | OUTPATIENT
Start: 2019-04-09 | End: 2019-05-17 | Stop reason: SDUPTHER

## 2019-04-09 NOTE — PROGRESS NOTES
Subjective:       Patient ID: Inder Matias is a 24 y.o. male.    Chief Complaint: Follow-up    HPI  23 y/o man with ADHD here for follow up, med refill.    ADHD - diagnosed in 5th grade, did see psychiatrist initially, then medication prescribed since 6th grade by PCP.  May 2016 - tried switching from adderall XR to immediate-release adderall, but felt this didn't work as well for him  Saw Dr Jalloh with psychiatry 3/2018 -- per her assessment, reasonable to continue on adderall XR at current dose.   No problems with anxiety, sleep, palpitations, tremor, or other adverse effects. Weight stable (was working on intentional weight loss, but regained in past month due to work stress / working out less).    Labs done 1/2019 with normal CBC, CMP. , HDL 56,      Was working out three times/week, currently twice/week due to work schedule  TRX strap workout, playing basketball sometimes    H/o cholesteatoma as a child - last ENT visit was in his teens (high school / just before college). Asks about whether ongoing ENT follow up is needed. No recent hearing change, tinnitus, or balance problems. No frequent ear infections or ear pain.    STI screening negative at last visit. Does have new partner since that time. No STI symptoms.    Review of Systems   Constitutional: Negative for activity change, fatigue and unexpected weight change.   HENT: Negative.  Negative for ear discharge, ear pain and hearing loss.    Eyes: Negative for visual disturbance.   Respiratory: Negative for cough and shortness of breath.    Cardiovascular: Negative for chest pain and palpitations.   Gastrointestinal: Negative for abdominal pain and nausea.   Endocrine: Negative.    Genitourinary: Negative.    Musculoskeletal: Negative.    Skin: Negative.    Neurological: Negative.  Negative for dizziness and light-headedness.   Psychiatric/Behavioral: Negative.  Negative for sleep disturbance. The patient is not nervous/anxious.          Past  "medical history, surgical history, and family medical history reviewed and updated as appropriate.    Medications and allergies reviewed.     Objective:          Vitals:    04/09/19 0833   BP: 108/80   BP Location: Left arm   Patient Position: Sitting   Pulse: 90   Temp: 97.9 °F (36.6 °C)   TempSrc: Oral   SpO2: 98%   Weight: 89.4 kg (197 lb 1.5 oz)   Height: 5' 8" (1.727 m)     Body mass index is 29.97 kg/m².  Physical Exam   Constitutional: He is oriented to person, place, and time. He appears well-developed and well-nourished. No distress.   HENT:   Head: Normocephalic and atraumatic.   Right Ear: External ear normal. No decreased hearing is noted.   Left Ear: External ear normal. No decreased hearing is noted.   Mouth/Throat: Oropharynx is clear and moist.   Mild amount of cerumen R ear, not impacted. Visible area of TM not erythematous. No pain with ear exam. L ear normal   Eyes: Pupils are equal, round, and reactive to light. Conjunctivae and EOM are normal. No scleral icterus.   Neck: Neck supple. No thyromegaly present.   Cardiovascular: Normal rate, regular rhythm and normal heart sounds.   No murmur heard.  Pulmonary/Chest: Effort normal and breath sounds normal. No respiratory distress.   Abdominal: Soft. There is no tenderness.   Musculoskeletal: He exhibits no edema or tenderness.   Lymphadenopathy:     He has no cervical adenopathy.   Neurological: He is alert and oriented to person, place, and time. He displays no tremor. No cranial nerve deficit. Gait normal.   Skin: Skin is warm and dry.   Psychiatric: He has a normal mood and affect. His behavior is normal.   Vitals reviewed.      Lab Results   Component Value Date    WBC 4.72 01/03/2019    HGB 16.4 01/03/2019    HCT 52.7 01/03/2019     01/03/2019    CHOL 217 (H) 01/03/2019    TRIG 97 01/03/2019    HDL 56 01/03/2019    ALT 34 01/03/2019    AST 22 01/03/2019     01/03/2019    K 3.8 01/03/2019     01/03/2019    CREATININE 1.0 " 01/03/2019    BUN 12 01/03/2019    CO2 26 01/03/2019       Assessment:       1. Attention deficit hyperactivity disorder (ADHD), combined type    2. Routine screening for STI (sexually transmitted infection)    3. History of ear disorder        Plan:   Inder was seen today for follow-up.    Diagnoses and all orders for this visit:    Attention deficit hyperactivity disorder (ADHD), combined type - no adverse effects with medication and this continues to help him manage his ADHD symptoms well. Reasonable to continue.  LABPPMP reviewed, appropriate  -     dextroamphetamine-amphetamine (ADDERALL XR) 20 MG 24 hr capsule; Take 2 capsules (40 mg total) by mouth every morning.    Routine screening for STI (sexually transmitted infection) - counseled re: having discussion re: STI testing with any new partner  Defers today but would like testing prior to next visit  -     HIV 1/2 Ag/Ab (4th Gen); Future  -     RPR; Future  -     C. trachomatis/N. gonorrhoeae by AMP DNA; Future    History of ear disorder - no recent symptoms; if having any hearing change, tinnitus, or other ear problem should contact clinic     Health maintenance reviewed - up to date.    Follow up in about 3 months (around 7/9/2019) for ADHD.    Jay Bright MD  Internal Medicine  [facility]  4/9/2019

## 2019-05-17 DIAGNOSIS — F90.2 ATTENTION DEFICIT HYPERACTIVITY DISORDER (ADHD), COMBINED TYPE: Chronic | ICD-10-CM

## 2019-05-17 RX ORDER — DEXTROAMPHETAMINE SACCHARATE, AMPHETAMINE ASPARTATE MONOHYDRATE, DEXTROAMPHETAMINE SULFATE AND AMPHETAMINE SULFATE 5; 5; 5; 5 MG/1; MG/1; MG/1; MG/1
40 CAPSULE, EXTENDED RELEASE ORAL EVERY MORNING
Qty: 60 CAPSULE | Refills: 0 | Status: SHIPPED | OUTPATIENT
Start: 2019-05-17 | End: 2019-06-20 | Stop reason: SDUPTHER

## 2019-06-20 DIAGNOSIS — F90.2 ATTENTION DEFICIT HYPERACTIVITY DISORDER (ADHD), COMBINED TYPE: Chronic | ICD-10-CM

## 2019-06-20 RX ORDER — DEXTROAMPHETAMINE SACCHARATE, AMPHETAMINE ASPARTATE MONOHYDRATE, DEXTROAMPHETAMINE SULFATE AND AMPHETAMINE SULFATE 5; 5; 5; 5 MG/1; MG/1; MG/1; MG/1
40 CAPSULE, EXTENDED RELEASE ORAL EVERY MORNING
Qty: 60 CAPSULE | Refills: 0 | Status: SHIPPED | OUTPATIENT
Start: 2019-06-20 | End: 2019-07-22 | Stop reason: SDUPTHER

## 2019-06-21 NOTE — TELEPHONE ENCOUNTER
Rx flags that it may need prior auth - please check with patient to make sure he's able to get this. If it does need prior auth, see if he would be ok with getting it from [facility]

## 2019-07-18 ENCOUNTER — OFFICE VISIT (OUTPATIENT)
Dept: INTERNAL MEDICINE | Facility: CLINIC | Age: 24
End: 2019-07-18
Payer: COMMERCIAL

## 2019-07-18 ENCOUNTER — LAB VISIT (OUTPATIENT)
Dept: LAB | Facility: HOSPITAL | Age: 24
End: 2019-07-18
Attending: INTERNAL MEDICINE
Payer: COMMERCIAL

## 2019-07-18 VITALS
TEMPERATURE: 99 F | WEIGHT: 200.19 LBS | HEART RATE: 61 BPM | HEIGHT: 68 IN | OXYGEN SATURATION: 99 % | SYSTOLIC BLOOD PRESSURE: 120 MMHG | BODY MASS INDEX: 30.34 KG/M2 | DIASTOLIC BLOOD PRESSURE: 86 MMHG

## 2019-07-18 DIAGNOSIS — Z11.3 ROUTINE SCREENING FOR STI (SEXUALLY TRANSMITTED INFECTION): ICD-10-CM

## 2019-07-18 DIAGNOSIS — Z86.69 H/O CHOLESTEATOMA: ICD-10-CM

## 2019-07-18 DIAGNOSIS — F90.2 ATTENTION DEFICIT HYPERACTIVITY DISORDER (ADHD), COMBINED TYPE: Primary | ICD-10-CM

## 2019-07-18 DIAGNOSIS — Z86.69 HISTORY OF EAR DISORDER: ICD-10-CM

## 2019-07-18 LAB — HIV 1+2 AB+HIV1 P24 AG SERPL QL IA: NEGATIVE

## 2019-07-18 PROCEDURE — 3008F BODY MASS INDEX DOCD: CPT | Mod: CPTII,S$GLB,, | Performed by: INTERNAL MEDICINE

## 2019-07-18 PROCEDURE — 3008F PR BODY MASS INDEX (BMI) DOCUMENTED: ICD-10-PCS | Mod: CPTII,S$GLB,, | Performed by: INTERNAL MEDICINE

## 2019-07-18 PROCEDURE — 86592 SYPHILIS TEST NON-TREP QUAL: CPT

## 2019-07-18 PROCEDURE — 99999 PR PBB SHADOW E&M-EST. PATIENT-LVL IV: CPT | Mod: PBBFAC,,, | Performed by: INTERNAL MEDICINE

## 2019-07-18 PROCEDURE — 36415 COLL VENOUS BLD VENIPUNCTURE: CPT

## 2019-07-18 PROCEDURE — 86703 HIV-1/HIV-2 1 RESULT ANTBDY: CPT

## 2019-07-18 PROCEDURE — 99999 PR PBB SHADOW E&M-EST. PATIENT-LVL IV: ICD-10-PCS | Mod: PBBFAC,,, | Performed by: INTERNAL MEDICINE

## 2019-07-18 PROCEDURE — 99214 PR OFFICE/OUTPT VISIT, EST, LEVL IV, 30-39 MIN: ICD-10-PCS | Mod: S$GLB,,, | Performed by: INTERNAL MEDICINE

## 2019-07-18 PROCEDURE — 99214 OFFICE O/P EST MOD 30 MIN: CPT | Mod: S$GLB,,, | Performed by: INTERNAL MEDICINE

## 2019-07-18 NOTE — PROGRESS NOTES
Subjective:       Patient ID: Inder Matias is a 24 y.o. male.    Chief Complaint: Follow-up    HPI  25 y/o man with ADHD, h/o cholesteatoma here for follow up, med refill.    ADHD - diagnosed in 5th grade, did see psychiatrist initially, then medication prescribed since 6th grade by PCP.  Has tried immediate-release adderall but this was less effective for him  Eval by Dr Jalloh with psychiatry 3/2018 -- reasonable to continue on adderall XR at current dose.   No problems with anxiety, depression, sleep, palpitations, tremor, or other adverse effects. Weight stable. Mood generally good other than work stress.  Sometimes able to take drug holiday or half dose on weekends    Doing better with regular exercise when traveling; now not exercising at home.   Tries to follow healthy diet; generally eating out due to work/travel schedule    Would like referral to ENT for check given h/o multiple ear surgeries, cholesteatoma. No hearing changes that he has noted.    With same partner as previous, hasn't yet had STI screening done, does want to get this done.    Review of Systems   Constitutional: Negative for activity change, fatigue and unexpected weight change.   HENT: Negative.  Negative for ear discharge, ear pain and hearing loss.    Eyes: Negative for visual disturbance.   Respiratory: Negative for cough and shortness of breath.    Cardiovascular: Negative for chest pain and palpitations.   Gastrointestinal: Negative.  Negative for abdominal pain.   Endocrine: Negative.    Genitourinary: Negative.    Musculoskeletal: Negative.    Skin: Negative.    Neurological: Negative.  Negative for dizziness and light-headedness.   Psychiatric/Behavioral: Negative.  Negative for sleep disturbance. The patient is not nervous/anxious.          Past medical history, surgical history, and family medical history reviewed and updated as appropriate.    Medications and allergies reviewed.     Objective:          Vitals:    07/18/19 0846  "  BP: 120/86   BP Location: Left arm   Patient Position: Sitting   Pulse: 61   Temp: 98.5 °F (36.9 °C)   TempSrc: Oral   SpO2: 99%   Weight: 90.8 kg (200 lb 2.8 oz)   Height: 5' 8" (1.727 m)     Body mass index is 30.44 kg/m².  Physical Exam   Constitutional: He is oriented to person, place, and time. He appears well-developed and well-nourished. No distress.   HENT:   Head: Normocephalic and atraumatic.   Right Ear: External ear normal. No decreased hearing is noted.   Left Ear: External ear normal. No decreased hearing is noted.   Mouth/Throat: Oropharynx is clear and moist.   Mildly injected TM bilaterally; L TM with some haziness/scarring   Eyes: Pupils are equal, round, and reactive to light. Conjunctivae and EOM are normal. No scleral icterus.   Neck: Neck supple. No thyromegaly present.   Cardiovascular: Normal rate, regular rhythm and normal heart sounds.   No murmur heard.  Pulmonary/Chest: Effort normal and breath sounds normal. No respiratory distress.   Abdominal: Soft. There is no tenderness.   Musculoskeletal: He exhibits no edema or tenderness.   Lymphadenopathy:     He has no cervical adenopathy.   Neurological: He is alert and oriented to person, place, and time. He displays no tremor. No cranial nerve deficit. Gait normal.   Skin: Skin is warm and dry.   Psychiatric: He has a normal mood and affect. His behavior is normal.   Vitals reviewed.      Lab Results   Component Value Date    WBC 4.72 01/03/2019    HGB 16.4 01/03/2019    HCT 52.7 01/03/2019     01/03/2019    CHOL 217 (H) 01/03/2019    TRIG 97 01/03/2019    HDL 56 01/03/2019    ALT 34 01/03/2019    AST 22 01/03/2019     01/03/2019    K 3.8 01/03/2019     01/03/2019    CREATININE 1.0 01/03/2019    BUN 12 01/03/2019    CO2 26 01/03/2019       Assessment:       1. Attention deficit hyperactivity disorder (ADHD), combined type    2. H/O cholesteatoma    3. History of ear disorder        Plan:   Inder was seen today for " follow-up.    Diagnoses and all orders for this visit:    Attention deficit hyperactivity disorder (ADHD), combined type - stable, doing well, continue current medication with drug holiday when possible    H/O cholesteatoma  -     Ambulatory Referral to ENT  History of ear disorder  -     Ambulatory Referral to ENT  Requests referral to have ears checked; no symptoms    Counseled re: strategies for regular exercise  Doing well with eating healthy diet despite frequent travel - good changes.  Does want to get STI screening done that was ordered last visit.    Health maintenance reviewed with patient, up to date.    Follow up in about 3 months (around 10/18/2019) for ADHD.    Jay Bright MD  Internal Medicine  [facility]  7/18/2019

## 2019-07-18 NOTE — PATIENT INSTRUCTIONS
Call in to schedule an appointment for ear check with ENT    Screening blood tests: HIV, RPR  Screening urine test: GC/CT

## 2019-07-19 LAB — RPR SER QL: NORMAL

## 2019-07-22 DIAGNOSIS — F90.2 ATTENTION DEFICIT HYPERACTIVITY DISORDER (ADHD), COMBINED TYPE: Chronic | ICD-10-CM

## 2019-07-22 RX ORDER — DEXTROAMPHETAMINE SACCHARATE, AMPHETAMINE ASPARTATE MONOHYDRATE, DEXTROAMPHETAMINE SULFATE AND AMPHETAMINE SULFATE 5; 5; 5; 5 MG/1; MG/1; MG/1; MG/1
40 CAPSULE, EXTENDED RELEASE ORAL EVERY MORNING
Qty: 60 CAPSULE | Refills: 0 | Status: SHIPPED | OUTPATIENT
Start: 2019-07-22 | End: 2019-08-22 | Stop reason: SDUPTHER

## 2019-08-22 DIAGNOSIS — F90.2 ATTENTION DEFICIT HYPERACTIVITY DISORDER (ADHD), COMBINED TYPE: Chronic | ICD-10-CM

## 2019-08-22 RX ORDER — DEXTROAMPHETAMINE SACCHARATE, AMPHETAMINE ASPARTATE MONOHYDRATE, DEXTROAMPHETAMINE SULFATE AND AMPHETAMINE SULFATE 5; 5; 5; 5 MG/1; MG/1; MG/1; MG/1
40 CAPSULE, EXTENDED RELEASE ORAL EVERY MORNING
Qty: 60 CAPSULE | Refills: 0 | Status: SHIPPED | OUTPATIENT
Start: 2019-08-22 | End: 2019-09-30 | Stop reason: SDUPTHER

## 2019-09-03 ENCOUNTER — OFFICE VISIT (OUTPATIENT)
Dept: OTOLARYNGOLOGY | Facility: CLINIC | Age: 24
End: 2019-09-03
Payer: COMMERCIAL

## 2019-09-03 ENCOUNTER — CLINICAL SUPPORT (OUTPATIENT)
Dept: AUDIOLOGY | Facility: CLINIC | Age: 24
End: 2019-09-03
Payer: COMMERCIAL

## 2019-09-03 VITALS — WEIGHT: 197.06 LBS | HEIGHT: 68 IN | BODY MASS INDEX: 29.86 KG/M2

## 2019-09-03 DIAGNOSIS — H90.0 CONDUCTIVE HEARING LOSS, BILATERAL: Primary | ICD-10-CM

## 2019-09-03 DIAGNOSIS — Z86.69 HISTORY OF CHOLESTEATOMA: ICD-10-CM

## 2019-09-03 PROCEDURE — 3008F PR BODY MASS INDEX (BMI) DOCUMENTED: ICD-10-PCS | Mod: CPTII,S$GLB,, | Performed by: OTOLARYNGOLOGY

## 2019-09-03 PROCEDURE — 99203 PR OFFICE/OUTPT VISIT, NEW, LEVL III, 30-44 MIN: ICD-10-PCS | Mod: S$GLB,,, | Performed by: OTOLARYNGOLOGY

## 2019-09-03 PROCEDURE — 92567 PR TYMPA2METRY: ICD-10-PCS | Mod: S$GLB,,, | Performed by: AUDIOLOGIST-HEARING AID FITTER

## 2019-09-03 PROCEDURE — 99999 PR PBB SHADOW E&M-EST. PATIENT-LVL I: CPT | Mod: PBBFAC,,, | Performed by: AUDIOLOGIST-HEARING AID FITTER

## 2019-09-03 PROCEDURE — 92567 TYMPANOMETRY: CPT | Mod: S$GLB,,, | Performed by: AUDIOLOGIST-HEARING AID FITTER

## 2019-09-03 PROCEDURE — 92557 COMPREHENSIVE HEARING TEST: CPT | Mod: S$GLB,,, | Performed by: AUDIOLOGIST-HEARING AID FITTER

## 2019-09-03 PROCEDURE — 99999 PR PBB SHADOW E&M-EST. PATIENT-LVL III: ICD-10-PCS | Mod: PBBFAC,,, | Performed by: OTOLARYNGOLOGY

## 2019-09-03 PROCEDURE — 3008F BODY MASS INDEX DOCD: CPT | Mod: CPTII,S$GLB,, | Performed by: OTOLARYNGOLOGY

## 2019-09-03 PROCEDURE — 99999 PR PBB SHADOW E&M-EST. PATIENT-LVL I: ICD-10-PCS | Mod: PBBFAC,,, | Performed by: AUDIOLOGIST-HEARING AID FITTER

## 2019-09-03 PROCEDURE — 99203 OFFICE O/P NEW LOW 30 MIN: CPT | Mod: S$GLB,,, | Performed by: OTOLARYNGOLOGY

## 2019-09-03 PROCEDURE — 92557 PR COMPREHENSIVE HEARING TEST: ICD-10-PCS | Mod: S$GLB,,, | Performed by: AUDIOLOGIST-HEARING AID FITTER

## 2019-09-03 PROCEDURE — 99999 PR PBB SHADOW E&M-EST. PATIENT-LVL III: CPT | Mod: PBBFAC,,, | Performed by: OTOLARYNGOLOGY

## 2019-09-03 NOTE — PROGRESS NOTES
Subjective:       Patient ID: Inder Matias is a 24 y.o. male.    Chief Complaint: Other (ear check ulp)    HPI     Inder Matias is a 24 y.o. male presents for evaluation of history of cholesteatomas.  He has had bilateral cholesteatomas causing otorrhea and hearing loss.  He has had ear surgery bilaterally when he was a teenager.  He has been doing well for the last several years, he denies otorrhea, hearing loss or otalgia.      Review of Systems   Constitutional: Negative for chills, fever and unexpected weight change.   HENT: Negative for sore throat and trouble swallowing.    Eyes: Negative for pain and visual disturbance.   Respiratory: Negative for apnea and shortness of breath.    Cardiovascular: Negative for chest pain and palpitations.   Gastrointestinal: Negative for abdominal pain and nausea.   Endocrine: Negative for cold intolerance and heat intolerance.   Musculoskeletal: Negative for joint swelling and neck stiffness.   Skin: Negative for color change and rash.   Neurological: Negative for facial asymmetry and headaches.   Hematological: Negative for adenopathy. Does not bruise/bleed easily.   Psychiatric/Behavioral: Negative for agitation. The patient is not nervous/anxious.        Objective:      Physical Exam   Constitutional: He is oriented to person, place, and time. He appears well-developed and well-nourished. No distress.   HENT:   Head: Normocephalic and atraumatic.   Right Ear: External ear and ear canal normal. Tympanic membrane is scarred.   Left Ear: External ear and ear canal normal. Tympanic membrane is retracted (mild retraction of the posterior superior quadrant).   Nose: Nose normal.   Mouth/Throat: Uvula is midline, oropharynx is clear and moist and mucous membranes are normal.   Eyes: Pupils are equal, round, and reactive to light. Conjunctivae and EOM are normal.   Neck: Normal range of motion. No tracheal deviation present. No thyromegaly present.   Cardiovascular: Normal  rate and regular rhythm.   Pulmonary/Chest: Effort normal. No respiratory distress.   Musculoskeletal: Normal range of motion.   Lymphadenopathy:        Head (right side): No submental, no submandibular and no tonsillar adenopathy present.        Head (left side): No submental, no submandibular and no tonsillar adenopathy present.     He has no cervical adenopathy.   Neurological: He is alert and oriented to person, place, and time.   Psychiatric: He has a normal mood and affect. His behavior is normal.   Nursing note and vitals reviewed.      Data:    Audiogram tracings independently reviewed and discussed with patient shows mild CHL AU  Assessment:       1. Conductive hearing loss, bilateral    2. History of cholesteatoma        Plan:        very mild hearing loss, recommend observation   no evidence of cholesteatoma on exam today   follow up in 1 year for surveillance

## 2019-09-03 NOTE — PROGRESS NOTES
Inder Matias was seen in the clinic today for a hearing evaluation. Mr. Matias reported a history of cholesteatoma and ear surgery.       Otoscopy was unremarkable. Audiological testing revealed normal to borderline normal hearing with small air bone gaps, bilaterally. A speech reception threshold was obtained at 20 dBHL in both ears. Speech recognition was 100%, bilaterally.    Tympanometry revealed a Type A tympanogram with slight negative pressure in the left ear and a flat Type B tympanogram with normal ear canal volume in the right ear.    Recommendations:  1. Otologic evaluation  2. Annual hearing evaluation

## 2019-09-30 DIAGNOSIS — F90.2 ATTENTION DEFICIT HYPERACTIVITY DISORDER (ADHD), COMBINED TYPE: Chronic | ICD-10-CM

## 2019-09-30 RX ORDER — DEXTROAMPHETAMINE SACCHARATE, AMPHETAMINE ASPARTATE MONOHYDRATE, DEXTROAMPHETAMINE SULFATE AND AMPHETAMINE SULFATE 5; 5; 5; 5 MG/1; MG/1; MG/1; MG/1
40 CAPSULE, EXTENDED RELEASE ORAL EVERY MORNING
Qty: 60 CAPSULE | Refills: 0 | Status: SHIPPED | OUTPATIENT
Start: 2019-09-30

## 2019-10-23 ENCOUNTER — PATIENT OUTREACH (OUTPATIENT)
Dept: ADMINISTRATIVE | Facility: HOSPITAL | Age: 24
End: 2019-10-23

## 2020-10-05 ENCOUNTER — PATIENT MESSAGE (OUTPATIENT)
Dept: ADMINISTRATIVE | Facility: HOSPITAL | Age: 25
End: 2020-10-05

## 2021-01-04 ENCOUNTER — PATIENT MESSAGE (OUTPATIENT)
Dept: ADMINISTRATIVE | Facility: HOSPITAL | Age: 26
End: 2021-01-04

## 2021-04-05 ENCOUNTER — PATIENT MESSAGE (OUTPATIENT)
Dept: ADMINISTRATIVE | Facility: HOSPITAL | Age: 26
End: 2021-04-05